# Patient Record
Sex: FEMALE | Race: WHITE | NOT HISPANIC OR LATINO | Employment: OTHER | URBAN - NONMETROPOLITAN AREA
[De-identification: names, ages, dates, MRNs, and addresses within clinical notes are randomized per-mention and may not be internally consistent; named-entity substitution may affect disease eponyms.]

---

## 2017-05-02 ENCOUNTER — OFFICE VISIT (OUTPATIENT)
Dept: CARDIOLOGY | Facility: CLINIC | Age: 82
End: 2017-05-02

## 2017-05-02 VITALS
OXYGEN SATURATION: 92 % | BODY MASS INDEX: 26.58 KG/M2 | WEIGHT: 135.4 LBS | DIASTOLIC BLOOD PRESSURE: 58 MMHG | SYSTOLIC BLOOD PRESSURE: 94 MMHG | HEART RATE: 81 BPM | HEIGHT: 60 IN

## 2017-05-02 DIAGNOSIS — I25.119 CORONARY ARTERY DISEASE INVOLVING NATIVE CORONARY ARTERY OF NATIVE HEART WITH ANGINA PECTORIS (HCC): Primary | ICD-10-CM

## 2017-05-02 DIAGNOSIS — Z79.899 ENCOUNTER FOR LONG-TERM CURRENT USE OF MEDICATION: ICD-10-CM

## 2017-05-02 DIAGNOSIS — R00.2 PALPITATIONS: ICD-10-CM

## 2017-05-02 DIAGNOSIS — I10 ESSENTIAL HYPERTENSION: ICD-10-CM

## 2017-05-02 DIAGNOSIS — Z00.00 HEALTHCARE MAINTENANCE: ICD-10-CM

## 2017-05-02 DIAGNOSIS — R07.9 CHEST PAIN, UNSPECIFIED TYPE: ICD-10-CM

## 2017-05-02 DIAGNOSIS — R06.02 SHORTNESS OF BREATH: ICD-10-CM

## 2017-05-02 DIAGNOSIS — E78.5 HYPERLIPIDEMIA, UNSPECIFIED HYPERLIPIDEMIA TYPE: ICD-10-CM

## 2017-05-02 DIAGNOSIS — I42.9 CARDIOMYOPATHY (HCC): ICD-10-CM

## 2017-05-02 PROCEDURE — 99214 OFFICE O/P EST MOD 30 MIN: CPT | Performed by: NURSE PRACTITIONER

## 2017-05-02 PROCEDURE — 93000 ELECTROCARDIOGRAM COMPLETE: CPT | Performed by: NURSE PRACTITIONER

## 2017-05-02 RX ORDER — LEVOTHYROXINE SODIUM 0.05 MG/1
50 TABLET ORAL DAILY
Refills: 0 | COMMUNITY
Start: 2017-03-22 | End: 2017-07-07

## 2017-05-03 ENCOUNTER — TELEPHONE (OUTPATIENT)
Dept: CARDIOLOGY | Facility: CLINIC | Age: 82
End: 2017-05-03

## 2017-05-05 ENCOUNTER — TELEPHONE (OUTPATIENT)
Dept: CARDIOLOGY | Facility: CLINIC | Age: 82
End: 2017-05-05

## 2017-05-11 ENCOUNTER — OFFICE VISIT (OUTPATIENT)
Dept: CARDIOLOGY | Facility: CLINIC | Age: 82
End: 2017-05-11

## 2017-05-11 VITALS — OXYGEN SATURATION: 98 % | HEART RATE: 87 BPM | DIASTOLIC BLOOD PRESSURE: 47 MMHG | SYSTOLIC BLOOD PRESSURE: 74 MMHG

## 2017-05-11 DIAGNOSIS — I10 ESSENTIAL HYPERTENSION: ICD-10-CM

## 2017-05-11 DIAGNOSIS — R07.9 CHEST PAIN, UNSPECIFIED TYPE: ICD-10-CM

## 2017-05-11 DIAGNOSIS — R06.02 SHORTNESS OF BREATH: ICD-10-CM

## 2017-05-11 DIAGNOSIS — I47.1 SVT (SUPRAVENTRICULAR TACHYCARDIA) (HCC): Primary | ICD-10-CM

## 2017-05-11 DIAGNOSIS — E78.5 HYPERLIPIDEMIA, UNSPECIFIED HYPERLIPIDEMIA TYPE: ICD-10-CM

## 2017-05-11 PROCEDURE — 93000 ELECTROCARDIOGRAM COMPLETE: CPT | Performed by: NURSE PRACTITIONER

## 2017-05-11 PROCEDURE — 99214 OFFICE O/P EST MOD 30 MIN: CPT | Performed by: NURSE PRACTITIONER

## 2017-05-11 RX ORDER — AMIODARONE HYDROCHLORIDE 200 MG/1
TABLET ORAL
Qty: 60 TABLET | Refills: 3 | Status: SHIPPED | OUTPATIENT
Start: 2017-05-11 | End: 2017-07-07

## 2017-05-12 ENCOUNTER — CLINICAL SUPPORT (OUTPATIENT)
Dept: CARDIOLOGY | Facility: CLINIC | Age: 82
End: 2017-05-12

## 2017-05-12 VITALS — DIASTOLIC BLOOD PRESSURE: 54 MMHG | HEART RATE: 78 BPM | SYSTOLIC BLOOD PRESSURE: 94 MMHG

## 2017-05-12 DIAGNOSIS — I95.0 IDIOPATHIC HYPOTENSION: Primary | ICD-10-CM

## 2017-05-12 DIAGNOSIS — R53.82 CHRONIC FATIGUE: ICD-10-CM

## 2017-05-12 DIAGNOSIS — E86.0 DEHYDRATION: ICD-10-CM

## 2017-05-12 DIAGNOSIS — R79.89 ELEVATED SERUM CREATININE: ICD-10-CM

## 2017-05-12 PROBLEM — I95.9 HYPOTENSION: Status: ACTIVE | Noted: 2017-05-12

## 2017-05-15 ENCOUNTER — TELEPHONE (OUTPATIENT)
Dept: CARDIOLOGY | Facility: CLINIC | Age: 82
End: 2017-05-15

## 2017-05-15 DIAGNOSIS — Z79.899 ENCOUNTER FOR LONG-TERM CURRENT USE OF MEDICATION: Primary | ICD-10-CM

## 2017-05-15 DIAGNOSIS — R79.89 ELEVATED SERUM CREATININE: ICD-10-CM

## 2017-05-15 DIAGNOSIS — Z00.00 HEALTHCARE MAINTENANCE: ICD-10-CM

## 2017-05-18 ENCOUNTER — TELEPHONE (OUTPATIENT)
Dept: CARDIOLOGY | Facility: CLINIC | Age: 82
End: 2017-05-18

## 2017-05-19 ENCOUNTER — OUTSIDE FACILITY SERVICE (OUTPATIENT)
Dept: CARDIOLOGY | Facility: CLINIC | Age: 82
End: 2017-05-19

## 2017-05-19 ENCOUNTER — TELEPHONE (OUTPATIENT)
Dept: CARDIOLOGY | Facility: CLINIC | Age: 82
End: 2017-05-19

## 2017-05-19 PROCEDURE — 93228 REMOTE 30 DAY ECG REV/REPORT: CPT | Performed by: INTERNAL MEDICINE

## 2017-05-25 ENCOUNTER — HOSPITAL ENCOUNTER (OUTPATIENT)
Dept: CARDIOLOGY | Facility: HOSPITAL | Age: 82
Discharge: HOME | End: 2017-05-25

## 2017-05-25 ENCOUNTER — CLINICAL SUPPORT (OUTPATIENT)
Dept: CARDIOLOGY | Facility: CLINIC | Age: 82
End: 2017-05-25

## 2017-05-25 ENCOUNTER — OUTSIDE FACILITY SERVICE (OUTPATIENT)
Dept: CARDIOLOGY | Facility: CLINIC | Age: 82
End: 2017-05-25

## 2017-05-25 VITALS
DIASTOLIC BLOOD PRESSURE: 55 MMHG | HEIGHT: 60 IN | HEART RATE: 61 BPM | OXYGEN SATURATION: 95 % | WEIGHT: 135 LBS | SYSTOLIC BLOOD PRESSURE: 113 MMHG | BODY MASS INDEX: 26.5 KG/M2

## 2017-05-25 DIAGNOSIS — I47.1 SVT (SUPRAVENTRICULAR TACHYCARDIA) (HCC): Primary | ICD-10-CM

## 2017-05-25 LAB
MAXIMAL PREDICTED HEART RATE: 140 BPM
STRESS TARGET HR: 119 BPM

## 2017-05-25 PROCEDURE — 93306 TTE W/DOPPLER COMPLETE: CPT

## 2017-05-25 PROCEDURE — 25010000002 REGADENOSON 0.4 MG/5ML SOLUTION: Performed by: INTERNAL MEDICINE

## 2017-05-25 PROCEDURE — 93306 TTE W/DOPPLER COMPLETE: CPT | Performed by: INTERNAL MEDICINE

## 2017-05-25 PROCEDURE — A9500 TC99M SESTAMIBI: HCPCS | Performed by: INTERNAL MEDICINE

## 2017-05-25 PROCEDURE — 93018 CV STRESS TEST I&R ONLY: CPT | Performed by: INTERNAL MEDICINE

## 2017-05-25 PROCEDURE — 78452 HT MUSCLE IMAGE SPECT MULT: CPT

## 2017-05-25 PROCEDURE — 78452 HT MUSCLE IMAGE SPECT MULT: CPT | Performed by: INTERNAL MEDICINE

## 2017-05-25 PROCEDURE — 93017 CV STRESS TEST TRACING ONLY: CPT

## 2017-05-25 PROCEDURE — 93000 ELECTROCARDIOGRAM COMPLETE: CPT | Performed by: NURSE PRACTITIONER

## 2017-05-25 PROCEDURE — 0 TECHNETIUM SESTAMIBI: Performed by: INTERNAL MEDICINE

## 2017-05-25 RX ADMIN — Medication 1 DOSE: at 11:45

## 2017-05-25 RX ADMIN — REGADENOSON 0.4 MG: 0.08 INJECTION, SOLUTION INTRAVENOUS at 11:45

## 2017-05-31 ENCOUNTER — DOCUMENTATION (OUTPATIENT)
Dept: CARDIOLOGY | Facility: CLINIC | Age: 82
End: 2017-05-31

## 2017-06-20 ENCOUNTER — OFFICE VISIT (OUTPATIENT)
Dept: CARDIOLOGY | Facility: CLINIC | Age: 82
End: 2017-06-20

## 2017-06-20 VITALS
HEIGHT: 60 IN | WEIGHT: 134 LBS | HEART RATE: 64 BPM | DIASTOLIC BLOOD PRESSURE: 66 MMHG | SYSTOLIC BLOOD PRESSURE: 103 MMHG | OXYGEN SATURATION: 96 % | BODY MASS INDEX: 26.31 KG/M2

## 2017-06-20 DIAGNOSIS — I10 ESSENTIAL HYPERTENSION: ICD-10-CM

## 2017-06-20 DIAGNOSIS — R06.02 SHORTNESS OF BREATH: ICD-10-CM

## 2017-06-20 DIAGNOSIS — R94.39 ABNORMAL STRESS TEST: ICD-10-CM

## 2017-06-20 DIAGNOSIS — E78.5 HYPERLIPIDEMIA, UNSPECIFIED HYPERLIPIDEMIA TYPE: ICD-10-CM

## 2017-06-20 DIAGNOSIS — I25.10 CORONARY ARTERY DISEASE INVOLVING NATIVE CORONARY ARTERY OF NATIVE HEART WITHOUT ANGINA PECTORIS: Primary | ICD-10-CM

## 2017-06-20 PROCEDURE — 99213 OFFICE O/P EST LOW 20 MIN: CPT | Performed by: NURSE PRACTITIONER

## 2017-06-20 NOTE — PROGRESS NOTES
Subjective   Ann Marie Burkett is a 82 y.o. female     Chief Complaint   Patient presents with   • Follow-up     patient appears in office today for follow up with test results       HPI    Problem List:    1.) Minor Non-Obstructive CAD per cath. 2006  1.1) Stress Test 5/25/17 - anteroapical and apical infarct with minimal saqib-infarct ischemia; preserved LVEF  2.) Dilated cardiomyopathy  2.1) Echo 12/16/13 - mild LVH; EF 50-55%; DD II; hypermobile intra-atrial septum with marce septal aneurism or doppler evidence of trans-atrial flow; mild AS; mild MR and TR; trace AZ; PA 35-40  2.2) Echo 5/25/17 - mild LVH; EF 45-50%; DD II; mild MR, TR and AZ PA 40-45  3.) HTN  4.) Dyslipidemia  5.) Shortness of Breath with exertion  6.) SVT     Patient is an 82-year-old female who presents today for a follow-up on test results with her daughter at her side.  She denies any chest pain, pressure, palpitations, fluttering, dizziness, presyncope, syncope, orthopnea or PND.  She does get some edema in her ankles.  She says that she is very short of breath and fatigued with activity and this is really bothersome for her.      We went over stress and echo.  Patient and her daughter want to discuss POC with her other children and her .  I explained the process of the [facility].      Current Outpatient Prescriptions   Medication Sig Dispense Refill   • acetaminophen (TYLENOL) 325 MG tablet Take  by mouth.     • amiodarone (PACERONE) 200 MG tablet 200 MG THREE TIMES A DAY WITH MEALS FOR 2 WEEKS THEN DAILY WITH MEALS 60 tablet 3   • aspirin 81 MG tablet Take  by mouth daily.     • carvedilol (COREG) 12.5 MG tablet Take 12.5 mg by mouth 2 (Two) Times a Day As Needed. Only to be taken if b/p is over 110 systolic     • citalopram (CeleXA) 20 MG tablet Take  by mouth daily.     • fenofibrate micronized (LOFIBRA) 134 MG capsule Take  by mouth daily.     • ferrous sulfate 325 (65 FE) MG tablet Take  by mouth daily.     • Flaxseed-Sunshine Prim-Borage  (FLAX OIL XTRA) capsule Take  by mouth.     • furosemide (LASIX) 40 MG tablet Take 20 mg by mouth Daily As Needed.     • glipiZIDE (GLUCOTROL) 5 MG ER tablet Take  by mouth daily.     • Glucosamine-Chondroit-Vit C-Mn (GLUCOSAMINE-CHONDROITIN) tablet Take  by mouth.     • levothyroxine (SYNTHROID, LEVOTHROID) 50 MCG tablet Take 50 mcg by mouth Daily.  0   • Linagliptin (TRADJENTA PO) Take 50 mg by mouth Daily.     • Multiple Vitamins-Minerals (CENTRUM SILVER) tablet Take  by mouth daily.     • nitroglycerin (NITROSTAT) 0.4 MG SL tablet Place 1 tablet under the tongue every 5 (five) minutes as needed for chest pain. 30 tablet 3   • Omega-3 Fatty Acids (FISH OIL) 1000 MG capsule capsule Take  by mouth.     • omeprazole (PriLOSEC) 20 MG capsule daily.  0   • rosuvastatin (CRESTOR) 20 MG tablet Take  by mouth.     • traMADol (ULTRAM) 50 MG tablet Take  by mouth.       No current facility-administered medications for this visit.        ALLERGIES    Review of patient's allergies indicates no known allergies.    Past Medical History:   Diagnosis Date   • Congestive cardiomyopathy    • Coronary artery disease    • Dyslipidemia    • History of esophageal reflux    • History of osteoarthritis    • Hyperlipidemia    • Hypertension        Social History     Social History   • Marital status:      Spouse name: N/A   • Number of children: N/A   • Years of education: N/A     Occupational History   • Not on file.     Social History Main Topics   • Smoking status: Never Smoker   • Smokeless tobacco: Not on file   • Alcohol use No   • Drug use: No   • Sexual activity: Not on file     Other Topics Concern   • Not on file     Social History Narrative       Family History   Problem Relation Age of Onset   • Arthritis Mother    • Hypertension Mother    • Heart disease Mother    • Heart failure Mother    • Heart disease Father    • Hypertension Father    • Arthritis Father        Review of Systems   Constitutional: Positive for  "fatigue (increase in fatigue). Negative for diaphoresis.   HENT: Positive for rhinorrhea. Negative for sneezing.    Eyes: Positive for visual disturbance (wears glasses daily).   Respiratory: Positive for shortness of breath (pt states she gets SOA quickly). Negative for cough, chest tightness and wheezing.    Cardiovascular: Positive for leg swelling (BLE slight swelling around ankles). Negative for chest pain and palpitations.   Gastrointestinal: Negative for abdominal pain, nausea and vomiting.   Endocrine: Negative for cold intolerance, heat intolerance, polyphagia and polyuria.   Genitourinary: Positive for urgency (pt states she cannot hold her bladder). Negative for difficulty urinating and frequency.   Musculoskeletal: Positive for arthralgias (pt states she has arthritis all over). Negative for back pain, joint swelling, myalgias and neck pain.   Skin: Negative.  Negative for rash and wound.   Allergic/Immunologic: Positive for environmental allergies. Negative for food allergies.   Neurological: Negative for dizziness, weakness, light-headedness and headaches.   Hematological: Does not bruise/bleed easily.   Psychiatric/Behavioral: Positive for agitation (pt states she gets aggitated easily somedays) and confusion (patient states she gets aggitated easily). Negative for sleep disturbance. The patient is not nervous/anxious.        Objective   /66 (BP Location: Left arm, Patient Position: Sitting)  Pulse 64  Ht 60\" (152.4 cm)  Wt 134 lb (60.8 kg)  SpO2 96%  BMI 26.17 kg/m2  Lab Results (most recent)     None        Physical Exam   Constitutional: She is oriented to person, place, and time. Vital signs are normal. She appears well-developed and well-nourished. She is active and cooperative.   HENT:   Head: Normocephalic.   Eyes: Lids are normal.   Wears glasses    Neck: Normal carotid pulses, no hepatojugular reflux and no JVD present. Carotid bruit is not present.   Abdominal: Normal appearance " and bowel sounds are normal.   Musculoskeletal:        Left knee: Tenderness found.   Uses a cane    Neurological: She is alert and oriented to person, place, and time.   Skin: Skin is warm, dry and intact.   Psychiatric: She has a normal mood and affect. Her speech is normal and behavior is normal. Judgment and thought content normal. Cognition and memory are normal.       Procedure   Procedures         Assessment/Plan      Diagnosis Plan   1. Coronary artery disease involving native coronary artery of native heart without angina pectoris     2. Essential hypertension     3. Hyperlipidemia, unspecified hyperlipidemia type     4. Abnormal stress test     5. Shortness of breath         Return patient will call for follow-up .    CAD/Abnormal stress test/Hypertension/Dyslipidemia/Shortness of breath - Patient will discuss having LHC with her family and let us know how she wants to proceed.  I went over the procedure with them and the need for Plavix if that is the case.  They will call back in and let us know.  Also discussed pulmonary referral due to Pulmonary Hypertension, but if they have cath they will wait until those findings to determine if they want to go to Pulmonary or not.  She will continue her medication regimen for now and will make follow-up once they determine if she will have a LHC or not.

## 2017-06-20 NOTE — PATIENT INSTRUCTIONS
Coronary Angiogram  A coronary angiogram, also called coronary angiography, is an X-ray procedure used to look at the arteries in the heart. In this procedure, a dye (contrast dye) is injected through a long, hollow tube (catheter). The catheter is about the size of a piece of cooked spaghetti and is inserted through your groin, wrist, or arm. The dye is injected into each artery, and X-rays are then taken to show if there is a blockage in the arteries of your heart.  LET YOUR HEALTH CARE PROVIDER KNOW ABOUT:  · Any allergies you have, including allergies to shellfish or contrast dye.    · All medicines you are taking, including vitamins, herbs, eye drops, creams, and over-the-counter medicines.    · Previous problems you or members of your family have had with the use of anesthetics.    · Any blood disorders you have.    · Previous surgeries you have had.  · History of kidney problems or failure.    · Other medical conditions you have.  RISKS AND COMPLICATIONS   Generally, a coronary angiogram is a safe procedure. However, problems can occur and include:  · Allergic reaction to the dye.  · Bleeding from the access site or other locations.  · Kidney injury, especially in people with impaired kidney function.   · Stroke (rare).  · Heart attack (rare).  BEFORE THE PROCEDURE   · Do not eat or drink anything after midnight the night before the procedure or as directed by your health care provider.    · Ask your health care provider about changing or stopping your regular medicines. This is especially important if you are taking diabetes medicines or blood thinners.  PROCEDURE  · You may be given a medicine to help you relax (sedative) before the procedure. This medicine is given through an intravenous (IV) access tube that is inserted into one of your veins.    · The area where the catheter will be inserted will be washed and shaved. This is usually done in the groin but may be done in the fold of your arm (near your  elbow) or in the wrist.     · A medicine will be given to numb the area where the catheter will be inserted (local anesthetic).    · The health care provider will insert the catheter into an artery. The catheter will be guided by using a special type of X-ray (fluoroscopy) of the blood vessel being examined.    · A special dye will then be injected into the catheter, and X-rays will be taken. The dye will help to show where any narrowing or blockages are located in the heart arteries.    AFTER THE PROCEDURE   · If the procedure is done through the leg, you will be kept in bed lying flat for several hours. You will be instructed to not bend or cross your legs.  · The insertion site will be checked frequently.    · The pulse in your feet or wrist will be checked frequently.    · Additional blood tests, X-rays, and an electrocardiogram may be done.       This information is not intended to replace advice given to you by your health care provider. Make sure you discuss any questions you have with your health care provider.     Document Released: 06/23/2004 Document Revised: 01/08/2016 Document Reviewed: 05/12/2014  [employer] Interactive Patient Education ©2017 [employer]

## 2017-06-29 ENCOUNTER — TELEPHONE (OUTPATIENT)
Dept: CARDIOLOGY | Facility: CLINIC | Age: 82
End: 2017-06-29

## 2017-06-29 NOTE — TELEPHONE ENCOUNTER
I talked to the patient daughter. She was worried about a drug interaction. I talked with PARK Gupta and she stated that we watch the patient with [facility] and if she has any problems to give us a call at the office.

## 2017-06-29 NOTE — TELEPHONE ENCOUNTER
----- Message from Qiana Saucedo sent at 6/29/2017 11:25 AM EDT -----  Contact: irene (daughter)  THE PATIENTS DAUGHTER CALLED AND WANTED TO SPEAK TO A NURSE ABOUT A POSSIBLE DRUG INTERACTION.  SHE CAN BE REACHED -[phone].  THANKS

## 2017-07-07 ENCOUNTER — CONSULT (OUTPATIENT)
Dept: CARDIOLOGY | Facility: CLINIC | Age: 82
End: 2017-07-07

## 2017-07-07 VITALS
SYSTOLIC BLOOD PRESSURE: 128 MMHG | DIASTOLIC BLOOD PRESSURE: 62 MMHG | WEIGHT: 130 LBS | HEIGHT: 65 IN | BODY MASS INDEX: 21.66 KG/M2 | HEART RATE: 58 BPM

## 2017-07-07 DIAGNOSIS — I42.0 CONGESTIVE CARDIOMYOPATHY (HCC): ICD-10-CM

## 2017-07-07 DIAGNOSIS — R94.39 ABNORMAL STRESS TEST: ICD-10-CM

## 2017-07-07 DIAGNOSIS — I10 ESSENTIAL HYPERTENSION: ICD-10-CM

## 2017-07-07 DIAGNOSIS — I47.1 SVT (SUPRAVENTRICULAR TACHYCARDIA) (HCC): Primary | ICD-10-CM

## 2017-07-07 PROCEDURE — 99203 OFFICE O/P NEW LOW 30 MIN: CPT | Performed by: INTERNAL MEDICINE

## 2017-07-07 RX ORDER — LEVOTHYROXINE SODIUM 0.03 MG/1
25 TABLET ORAL DAILY
COMMUNITY
End: 2018-01-15 | Stop reason: DRUGHIGH

## 2017-07-07 RX ORDER — AMIODARONE HYDROCHLORIDE 200 MG/1
200 TABLET ORAL 2 TIMES DAILY
COMMUNITY
End: 2018-01-25 | Stop reason: SDUPTHER

## 2017-07-07 RX ORDER — POLYETHYLENE GLYCOL 3350 17 G/17G
17 POWDER, FOR SOLUTION ORAL DAILY PRN
COMMUNITY

## 2017-07-07 NOTE — PROGRESS NOTES
Ann Marie Burkett  1935  [phone]      07/07/2017    [facility] CARDIOLOGY    Leonel Leger MD  [address] [place] [address]    REFERRING DOCTOR : Dr. Chaudhari        Patient ID: Ann Marie Burkett is a 82 y.o. female resident of [place], [place]    Chief Complaint: SVT    Problem List:  1. SVT    A)Amiodarone started 5/11/17 decreased to once a day 5/27/17   B)Episode in Dr chaudhari's office with cv to sinus with vagal maneuvers.  I have no documentation of any atrial fibrillation.    2. Dilated NICM   A)Echo 12/16/13 EF 50%   B)Echo 5/25/17 EF 45-50%, mild LAE.   3. Vulva Cancer with upcoming evaluation   4. Nonobstructive CAD by remote [facility]. 2006    No Known Allergies    Current Outpatient Prescriptions:   •  acetaminophen (TYLENOL) 325 MG tablet, Take  by mouth., Disp: , Rfl:   •  amiodarone (PACERONE) 200 MG tablet, Take 200 mg by mouth Daily., Disp: , Rfl:   •  aspirin 81 MG tablet, Take  by mouth daily., Disp: , Rfl:   •  carvedilol (COREG) 12.5 MG tablet, Take 12.5 mg by mouth 2 (Two) Times a Day As Needed. Only to be taken if b/p is over 110 systolic, Disp: , Rfl:   •  citalopram (CeleXA) 20 MG tablet, Take  by mouth daily., Disp: , Rfl:   •  fenofibrate micronized (LOFIBRA) 134 MG capsule, Take  by mouth daily., Disp: , Rfl:   •  ferrous sulfate 325 (65 FE) MG tablet, Take  by mouth daily., Disp: , Rfl:   •  Flaxseed-Sunshine Prim-Borage (FLAX OIL XTRA) capsule, Take  by mouth., Disp: , Rfl:   •  glipiZIDE (GLUCOTROL) 5 MG ER tablet, Take  by mouth daily., Disp: , Rfl:   •  levothyroxine (SYNTHROID, LEVOTHROID) 25 MCG tablet, Take 25 mcg by mouth Daily., Disp: , Rfl:   •  Linagliptin (TRADJENTA PO), Take 50 mg by mouth Daily., Disp: , Rfl:   •  Multiple Vitamins-Minerals (CENTRUM SILVER) tablet, Take  by mouth daily., Disp: , Rfl:   •  nitroglycerin (NITROSTAT) 0.4 MG SL tablet, Place 1 tablet under the tongue every 5 (five) minutes as needed for chest pain., Disp: 30 tablet,  Rfl: 3  •  Omega-3 Fatty Acids (FISH OIL) 1000 MG capsule capsule, Take  by mouth., Disp: , Rfl:   •  omeprazole (PriLOSEC) 20 MG capsule, daily., Disp: , Rfl: 0  •  polyethylene glycol (MIRALAX) packet, Take 17 g by mouth Daily., Disp: , Rfl:   •  rosuvastatin (CRESTOR) 20 MG tablet, Take  by mouth., Disp: , Rfl:   •  traMADol (ULTRAM) 50 MG tablet, Take  by mouth., Disp: , Rfl:     History of Present Illness  Patient presents today for consultation referred by Dr. Chaudhari regarding svt. She has had recent palpitations once occurring over a year ago only lasting a few hours.  IT has reoccurred the past few weeks happening a total of three times associated with palpitations, sob, and hypotension.  She started amiodarone with Dr. Chaudhari and tolerating the medication well.  She denies any recurrent episodes of Palpitations.  She denies any Chest pain, sob, or syncope events.  Overall since that time she's been doing well.  Seems the palpitations started in April at the time of her dealing with her GYN issues.  She's had 2 total episodes, one in April one in May.    The following portions of the patient's history were reviewed and updated as appropriate: allergies, current medications, past family history, past medical history, past social history, past surgical history and problem list.    Past Medical History:   Diagnosis Date   • Congestive cardiomyopathy    • Coronary artery disease    • Dyslipidemia    • History of esophageal reflux    • History of osteoarthritis    • Hyperlipidemia    • Hypertension          Past Surgical History:   Procedure Laterality Date   • CARDIAC CATHETERIZATION         Social History     Social History   • Marital status:      Spouse name: N/A   • Number of children: N/A   • Years of education: N/A     Occupational History   • Not on file.     Social History Main Topics   • Smoking status: Never Smoker   • Smokeless tobacco: Not on file   • Alcohol use No   • Drug use: No   •  "Sexual activity: Defer     Other Topics Concern   • Not on file     Social History Narrative       Family History   Problem Relation Age of Onset   • Arthritis Mother    • Hypertension Mother    • Heart disease Mother    • Heart failure Mother    • Heart disease Father    • Hypertension Father    • Arthritis Father        REVIEW OF SYSTEMS:   CONSTITUTIONAL: No weight loss, fever, chills, weakness or fatigue.   HEENT: Eyes: No visual loss, blurred vision, double vision or yellow sclerae. Ears, Nose, Throat: No hearing loss, sneezing, congestion, runny nose or sore throat.   SKIN: No rash or itching.     RESPIRATORY: No shortness of breath, hemoptysis, cough or sputum.   GASTROINTESTINAL: No anorexia, nausea, vomiting or diarrhea. No abdominal pain, bright red blood per rectum or melena.  GENITOURINARY: No burning on urination, hematuria or increased frequency.  NEUROLOGICAL: No headache, dizziness, syncope, paralysis, ataxia, numbness or tingling in the extremities. No change in bowel or bladder control.   MUSCULOSKELETAL: No muscle, back pain, joint pain or stiffness.   HEMATOLOGIC: No anemia, bleeding or bruising.   LYMPHATICS: No enlarged nodes. No history of splenectomy.   PSYCHIATRIC: No history of depression, anxiety, hallucinations.   ENDOCRINOLOGIC: No reports of sweating, cold or heat intolerance. No polyuria or polydipsia.   Ext: No edema or bruising      The patient's old records including ambulatory rhythm recordings (ECGs, Holter/event monitor) were reviewed and discussed.      EKG sinus bradycardia heart rate 58 bpm with a first-degree AV block.     Objective:       Vitals:    07/07/17 1358   BP: 128/62   BP Location: Left arm   Patient Position: Sitting   Pulse: 58   Weight: 130 lb (59 kg)   Height: 65\" (165.1 cm)       Constitutional: oriented to person, place, and time.  well-developed and well-nourished. No distress.   HENT: Normocephalic.   Eyes: Conjunctivae are normal. No scleral icterus. "   Neck: Normal carotid pulses, no hepatojugular reflux and no JVD present. Carotid bruit is not present. No tracheal deviation, no edema and no erythema present. No thyromegaly present.   Cardiovascular: Normal rate, regular rhythm, S1 normal, S2 normal, normal heart sounds and intact distal pulses.   No extrasystoles are present. PMI is not displaced.  Exam reveals no gallop, no distant heart sounds and no friction rub.    No murmur heard.  Pulses:       Radial pulses are 2+ on the right side, and 2+ on the left side.       Dorsalis pedis pulses are 2+ on the right side, and 2+ on the left side.   Pulmonary/Chest: Effort normal and breath sounds normal. No respiratory distress. She has no decreased breath sounds.  no wheezes,  Rhonchi or rales.  no tenderness.   Abdominal: Soft. Bowel sounds are normal. She exhibits no distension and no mass. There is no hepatosplenomegaly. There is no tenderness. There is no rebound and no guarding.   Musculoskeletal:  exhibits no edema, tenderness or deformity.   Neurological: is alert and oriented to person, place, and time.   Skin: Skin is warm and dry. No rash noted. No diaphoretic. No cyanosis or erythema. No pallor. Nails show no clubbing.   Psychiatric: Normal mood and affect.Speech is normal and behavior is normal.    Lab Review:   Results for orders placed or performed in visit on 05/02/17   Stress Test With Myocardial Perfusion One Day   Result Value Ref Range    Target HR (85%) 119 bpm    Max. Pred. HR (100%) 140 bpm           Diagnosis:      Diagnosis Plan   1. SVT (supraventricular tachycardia)     2. Abnormal stress test     3. Congestive cardiomyopathy     4. Essential hypertension           Assessment & Plan:   1.  SVT with up to at least 2-3 recurrences since April 2017.  No recurrences since starting amiodarone 200 mg daily.  Seems that the SVT episode started to occur at the time of her GYN issues in April.  I would continue the amiodarone for now and her get  through the valvular cancer issues and any procedure the need to be done.  Next follow-up consideration stopping the amiodarone if no recurrences and consider an EP study and ablation at that time if any recurrences or noted. Continue ASA  2.  Valvular cancer following up with [facility] here in the near future.  Okay per my standpoint to proceed with any surgery.  3.  Abnormal stress test ejection fraction 45%/ Cardiomyopathy. Dr. Chaudhari's team discussing left heart catheterization for the patient area and also noted to have some pulmonary hypertension which could be evaluated at the time of catheterization.  They need to discuss this prior to any procedure to see if Dr. Chaudhari is okay with proceeding with the surgery and if need for left heart catheterization is warranted.  4.  Follow-up in 3 or 4 months or sooner as needed.        FLAVAI Macdonald scribe for Dr. Jaime   07/07/17  3:02 PM    I, Ishan Jaime DO, personally performed the services described in this documentation as scribed by the above named individual in my presence, and it is both accurate and complete.  7/7/2017  3:38 PM    Ishan Jaime DO  3:38 PM  07/07/17

## 2017-07-11 ENCOUNTER — TELEPHONE (OUTPATIENT)
Dept: CARDIOLOGY | Facility: CLINIC | Age: 82
End: 2017-07-11

## 2017-07-11 NOTE — TELEPHONE ENCOUNTER
----- Message from Deb Hobbs sent at 7/10/2017 11:50 AM EDT -----  Contact: PT DAUGHTER MARIS CORDOVA  PLEASE CALL DAUGHTER BACK [phone]  SHE HAS SOME QUESTIONS ABOUT HER MOTHERS COREG (SPELLING)    Patient's daughter requested instructions for Coreg prn dosing that Carissa had previously given them. Reviewed medication instructions from patient's last office visit, Coreg 12.5 mg BID PRN SBP greater than 110. Maris verbalizes understanding.

## 2017-07-19 ENCOUNTER — OFFICE VISIT (OUTPATIENT)
Dept: CARDIOLOGY | Facility: CLINIC | Age: 82
End: 2017-07-19

## 2017-07-19 VITALS
OXYGEN SATURATION: 95 % | HEART RATE: 64 BPM | HEIGHT: 65 IN | WEIGHT: 135.2 LBS | DIASTOLIC BLOOD PRESSURE: 56 MMHG | SYSTOLIC BLOOD PRESSURE: 111 MMHG | BODY MASS INDEX: 22.53 KG/M2

## 2017-07-19 DIAGNOSIS — R06.02 SHORTNESS OF BREATH: ICD-10-CM

## 2017-07-19 DIAGNOSIS — I25.119 CORONARY ARTERY DISEASE INVOLVING NATIVE CORONARY ARTERY OF NATIVE HEART WITH ANGINA PECTORIS (HCC): ICD-10-CM

## 2017-07-19 DIAGNOSIS — Z00.00 HEALTHCARE MAINTENANCE: ICD-10-CM

## 2017-07-19 DIAGNOSIS — E78.5 HYPERLIPIDEMIA, UNSPECIFIED HYPERLIPIDEMIA TYPE: ICD-10-CM

## 2017-07-19 DIAGNOSIS — R94.39 ABNORMAL STRESS TEST: ICD-10-CM

## 2017-07-19 DIAGNOSIS — I25.10 CORONARY ARTERY DISEASE DUE TO CALCIFIED CORONARY LESION: Primary | ICD-10-CM

## 2017-07-19 DIAGNOSIS — R94.39 ABNORMAL STRESS TEST: Primary | ICD-10-CM

## 2017-07-19 DIAGNOSIS — I10 ESSENTIAL HYPERTENSION: ICD-10-CM

## 2017-07-19 DIAGNOSIS — I25.84 CORONARY ARTERY DISEASE DUE TO CALCIFIED CORONARY LESION: Primary | ICD-10-CM

## 2017-07-19 DIAGNOSIS — I47.1 SVT (SUPRAVENTRICULAR TACHYCARDIA) (HCC): ICD-10-CM

## 2017-07-19 PROCEDURE — 99214 OFFICE O/P EST MOD 30 MIN: CPT | Performed by: NURSE PRACTITIONER

## 2017-07-19 RX ORDER — CLOPIDOGREL BISULFATE 75 MG/1
TABLET ORAL
Qty: 90 TABLET | Refills: 1 | Status: SHIPPED | OUTPATIENT
Start: 2017-07-19 | End: 2017-09-20

## 2017-07-19 RX ORDER — CLOPIDOGREL BISULFATE 75 MG/1
75 TABLET ORAL DAILY
Qty: 30 TABLET | Refills: 11 | Status: SHIPPED | OUTPATIENT
Start: 2017-07-19 | End: 2017-07-19 | Stop reason: SDUPTHER

## 2017-07-19 NOTE — PROGRESS NOTES
Subjective   Ann Marie Burkett is a 82 y.o. female     Chief Complaint   Patient presents with   • Follow-up     presents as a follow up   • Shortness of Breath   • Coronary Artery Disease       HPI    Problem List:    1.) Minor Non-Obstructive CAD per cath. 2006  1.1) Stress Test 5/25/17 - anteroapical and apical infarct with minimal saqib-infarct ischemia; preserved LVEF  2.) Dilated cardiomyopathy  2.1) Echo 12/16/13 - mild LVH; EF 50-55%; DD II; hypermobile intra-atrial septum with marce septal aneurism or doppler evidence of trans-atrial flow; mild AS; mild MR and TR; trace OK; PA 35-40  2.2) Echo 5/25/17 - mild LVH; EF 45-50%; DD II; mild MR, TR and OK PA 40-45  3.) HTN  4.) Dyslipidemia  5.) Shortness of Breath with exertion  6.) SVT        7.) Vulvar CA, newly diagnosed going to get chemo and radiation at [facility].     Patient is an 82-year-old female who presents today for a follow-up with her daughter at her side.  She denies any chest pain, pressure, palpitations, fluttering, presyncope, syncope, orthopnea, PND or edema.  She will get dizzy she stands up too quickly.  She says she still does get short of breath especially if she tries to write should also she just tries to take her time however her shortness of breath has gotten worse over the past year or so.  I discussed with the patient the need for a left heart catheter.  Patient is about to undergo chemotherapy and radiation for newly diagnosed vulvar cancer.  Patient has a CT on Tuesday so we will get a BMP 1 week from next Tuesday and then determine when to schedule her heart catheter at that time.    Current Outpatient Prescriptions   Medication Sig Dispense Refill   • acetaminophen (TYLENOL) 325 MG tablet Take  by mouth.     • amiodarone (PACERONE) 200 MG tablet Take 200 mg by mouth Daily.     • aspirin 81 MG tablet Take  by mouth daily.     • carvedilol (COREG) 12.5 MG tablet Take 12.5 mg by mouth 2 (Two) Times a Day As Needed. Only to be taken if  b/p is over 130 systolic     • citalopram (CeleXA) 20 MG tablet Take  by mouth daily.     • fenofibrate micronized (LOFIBRA) 134 MG capsule Take  by mouth daily.     • ferrous sulfate 325 (65 FE) MG tablet Take  by mouth daily.     • Flaxseed-Sunshine Prim-Borage (FLAX OIL XTRA) capsule Take  by mouth.     • glipiZIDE (GLUCOTROL) 5 MG ER tablet Take  by mouth daily.     • levothyroxine (SYNTHROID, LEVOTHROID) 25 MCG tablet Take 25 mcg by mouth Daily.     • Linagliptin (TRADJENTA PO) Take 50 mg by mouth Daily.     • Multiple Vitamins-Minerals (CENTRUM SILVER) tablet Take  by mouth daily.     • nitroglycerin (NITROSTAT) 0.4 MG SL tablet Place 1 tablet under the tongue every 5 (five) minutes as needed for chest pain. 30 tablet 3   • Omega-3 Fatty Acids (FISH OIL) 1000 MG capsule capsule Take  by mouth.     • omeprazole (PriLOSEC) 20 MG capsule daily.  0   • polyethylene glycol (MIRALAX) packet Take 17 g by mouth Daily.     • rosuvastatin (CRESTOR) 20 MG tablet Take  by mouth.     • traMADol (ULTRAM) 50 MG tablet Take  by mouth.     • clopidogrel (PLAVIX) 75 MG tablet TAKE 1 TABLET BY MOUTH EVERY DAY 90 tablet 1     No current facility-administered medications for this visit.        ALLERGIES    Review of patient's allergies indicates no known allergies.    Past Medical History:   Diagnosis Date   • Cancer    • Congestive cardiomyopathy    • Coronary artery disease    • Dyslipidemia    • History of esophageal reflux    • History of osteoarthritis    • Hyperlipidemia    • Hypertension    • Vulvar cancer        Social History     Social History   • Marital status:      Spouse name: N/A   • Number of children: N/A   • Years of education: N/A     Occupational History   • Not on file.     Social History Main Topics   • Smoking status: Never Smoker   • Smokeless tobacco: Not on file   • Alcohol use No   • Drug use: No   • Sexual activity: Defer     Other Topics Concern   • Not on file     Social History Narrative  "      Family History   Problem Relation Age of Onset   • Arthritis Mother    • Hypertension Mother    • Heart disease Mother    • Heart failure Mother    • Heart disease Father    • Hypertension Father    • Arthritis Father        Review of Systems   Constitutional: Positive for fatigue. Negative for diaphoresis.   HENT: Positive for rhinorrhea. Negative for sneezing.    Eyes: Positive for visual disturbance (wears glasses ).   Respiratory: Positive for shortness of breath (if she rushes ). Negative for chest tightness.    Cardiovascular: Negative for chest pain, palpitations and leg swelling.   Gastrointestinal: Negative for nausea and vomiting.   Endocrine: Negative.    Genitourinary: Negative for difficulty urinating.        Vulvar CA   Musculoskeletal: Positive for arthralgias, back pain and gait problem (uses a cane). Negative for neck pain.   Skin: Negative.    Allergic/Immunologic: Positive for environmental allergies.   Neurological: Positive for dizziness (when she stands up quick ). Negative for syncope and light-headedness.   Hematological: Negative.    Psychiatric/Behavioral: Negative.        Objective   /56 (BP Location: Left arm, Patient Position: Sitting)  Pulse 64  Ht 65\" (165.1 cm)  Wt 135 lb 3.2 oz (61.3 kg)  SpO2 95%  BMI 22.5 kg/m2  Lab Results (most recent)     None        Physical Exam   Constitutional: She is oriented to person, place, and time. Vital signs are normal. She appears well-developed and well-nourished. She is active and cooperative.   HENT:   Head: Normocephalic.   Eyes: Lids are normal.   Wears glasses    Neck: Normal carotid pulses, no hepatojugular reflux and no JVD present. Carotid bruit is not present.   Cardiovascular: Normal rate, regular rhythm and normal heart sounds.    Pulses:       Radial pulses are 2+ on the right side, and 2+ on the left side.        Dorsalis pedis pulses are 2+ on the right side, and 2+ on the left side.        Posterior tibial pulses are " 2+ on the right side, and 2+ on the left side.   No edema BLE.   Pulmonary/Chest: Effort normal and breath sounds normal.   Abdominal: Normal appearance and bowel sounds are normal.   Musculoskeletal:   Uses a cane; curvature of the spine    Neurological: She is alert and oriented to person, place, and time.   Skin: Skin is warm, dry and intact.   Psychiatric: She has a normal mood and affect. Her speech is normal and behavior is normal. Judgment and thought content normal. Cognition and memory are normal.       Procedure   Procedures         Assessment/Plan      Diagnosis Plan   1. Abnormal stress test  Cardiac catheterization   2. SVT (supraventricular tachycardia)  Cardiac catheterization   3. Essential hypertension  Basic Metabolic Panel    Basic Metabolic Panel    Cardiac catheterization   4. Hyperlipidemia, unspecified hyperlipidemia type  Cardiac catheterization   5. Healthcare maintenance  Basic Metabolic Panel    Basic Metabolic Panel   6. Shortness of breath  Cardiac catheterization   7. Coronary artery disease involving native coronary artery of native heart with angina pectoris         Return we will schedule after we know [facility] date .  CAD/Abnormal stress test/shortness of breath/SVT/hypertension/hyperlipidemia-patient will have left heart catheter scheduled after having CT next Tuesday.  She will get a BMP 1 week after her CT scan and then we will determine the date for her heart catheter at that time.  Her needing to monitor creatinine to make sure that she gets back to baseline prior to her heart catheter.  She will start Plavix prior to her heart catheter and continue her medication regimen otherwise.  She will follow-up after left heart catheter or sooner if any changes.

## 2017-07-19 NOTE — PATIENT INSTRUCTIONS
Coronary Angiogram  A coronary angiogram, also called coronary angiography, is an X-ray procedure used to look at the arteries in the heart. In this procedure, a dye (contrast dye) is injected through a long, hollow tube (catheter). The catheter is about the size of a piece of cooked spaghetti and is inserted through your groin, wrist, or arm. The dye is injected into each artery, and X-rays are then taken to show if there is a blockage in the arteries of your heart.  LET YOUR HEALTH CARE PROVIDER KNOW ABOUT:  · Any allergies you have, including allergies to shellfish or contrast dye.    · All medicines you are taking, including vitamins, herbs, eye drops, creams, and over-the-counter medicines.    · Previous problems you or members of your family have had with the use of anesthetics.    · Any blood disorders you have.    · Previous surgeries you have had.  · History of kidney problems or failure.    · Other medical conditions you have.  RISKS AND COMPLICATIONS   Generally, a coronary angiogram is a safe procedure. However, problems can occur and include:  · Allergic reaction to the dye.  · Bleeding from the access site or other locations.  · Kidney injury, especially in people with impaired kidney function.   · Stroke (rare).  · Heart attack (rare).  BEFORE THE PROCEDURE   · Do not eat or drink anything after midnight the night before the procedure or as directed by your health care provider.    · Ask your health care provider about changing or stopping your regular medicines. This is especially important if you are taking diabetes medicines or blood thinners.  PROCEDURE  · You may be given a medicine to help you relax (sedative) before the procedure. This medicine is given through an intravenous (IV) access tube that is inserted into one of your veins.    · The area where the catheter will be inserted will be washed and shaved. This is usually done in the groin but may be done in the fold of your arm (near your  elbow) or in the wrist.     · A medicine will be given to numb the area where the catheter will be inserted (local anesthetic).    · The health care provider will insert the catheter into an artery. The catheter will be guided by using a special type of X-ray (fluoroscopy) of the blood vessel being examined.    · A special dye will then be injected into the catheter, and X-rays will be taken. The dye will help to show where any narrowing or blockages are located in the heart arteries.    AFTER THE PROCEDURE   · If the procedure is done through the leg, you will be kept in bed lying flat for several hours. You will be instructed to not bend or cross your legs.  · The insertion site will be checked frequently.    · The pulse in your feet or wrist will be checked frequently.    · Additional blood tests, X-rays, and an electrocardiogram may be done.       This information is not intended to replace advice given to you by your health care provider. Make sure you discuss any questions you have with your health care provider.     Document Released: 06/23/2004 Document Revised: 01/08/2016 Document Reviewed: 05/12/2014  [employer] Interactive Patient Education ©2017 [employer]

## 2017-07-20 ENCOUNTER — TELEPHONE (OUTPATIENT)
Dept: CARDIOLOGY | Facility: CLINIC | Age: 82
End: 2017-07-20

## 2017-07-20 NOTE — TELEPHONE ENCOUNTER
She just wanted to confirm what we discussed yesterday re: labs.  She will call the day they get the labs done so we know to look for them and can get the [facility] set up ASAP.  We are going to try to squeeze in her that week.

## 2017-08-01 ENCOUNTER — TELEPHONE (OUTPATIENT)
Dept: CARDIOLOGY | Facility: CLINIC | Age: 82
End: 2017-08-01

## 2017-08-01 NOTE — TELEPHONE ENCOUNTER
----- Message from Qiana Saucedo sent at 8/1/2017 11:07 AM EDT -----  Contact: SVITLANA (DAUGHTER)  THE PATIENTS DAUGHTER CALLED WANTING TO GET HER LAB RESULTS. SHE THOSE DONE YESTERDAY AT [facility].  SHE CAN BE REACHED [phone].  THANKS      Spoke with patient's daughter, Svitlana and instructed her re: her mother's recent lab results. I have also instructed her re: Cardiac Catheterization scheduled for 8/3/17, pre-cath meds and instructions. Patient's daughter verbalized understanding of these instructions and instructions for Pre-Med.

## 2017-08-03 ENCOUNTER — OUTSIDE FACILITY SERVICE (OUTPATIENT)
Dept: CARDIOLOGY | Facility: CLINIC | Age: 82
End: 2017-08-03

## 2017-08-03 PROCEDURE — 93458 L HRT ARTERY/VENTRICLE ANGIO: CPT | Performed by: INTERNAL MEDICINE

## 2017-09-07 ENCOUNTER — TELEPHONE (OUTPATIENT)
Dept: CARDIOLOGY | Facility: CLINIC | Age: 82
End: 2017-09-07

## 2017-09-07 NOTE — TELEPHONE ENCOUNTER
----- Message from PARK Pina sent at 9/7/2017  7:31 AM EDT -----  Contact: SVITLANA (DAUGHTER)  I would not as it will lower her BP even more and then may cause HR to stay high.  If she has consistent elevated HR with low BP have them call and get her in.  Dehydration will definitely cause both.  Thanks!  ----- Message -----     From: Dede Kilgore LPN     Sent: 9/6/2017   4:56 PM       To: PARK Pina    Occurred over the weekend, but Svitlana states that patient was dehydrated and is receiving fluids on an outpatient basis. She received fluids yesterday and will again tomorrow. Svitlana states she is unsure if she should give Coreg for an elevated HR with BP being low.   ----- Message -----     From: PARK Pina     Sent: 9/5/2017   3:16 PM       To: Dede Kilgore LPN    Is it consistently staying in 100s or was it just one time?  ----- Message -----     From: Dede Kilgore LPN     Sent: 9/5/2017   3:06 PM       To: PARK Pina    Do you want them to give her Coreg? Reports no problems at this time.   ----- Message -----     From: Qiana Saucedo     Sent: 9/5/2017   2:43 PM       To: Zakiya Chaudhari [facility]    THE PATIENT'S DAUGHTER CALLED AND STATES SHE IS GIVING HER MOTHER COREG IF HER BLOOD PRESSURE IS ABOVE 130 OR HER HEART RATE IS ABOVE 60.  SHE STATES RIGHT NOW HER BLOOD PRESSURE /64 AND HER HEART RATE .  SHE IS WANTING TO KNOW IF SHE NEEDS TO GIVE HER THE COREG OR HOLD IT.  SHE CAN BE REACHED [phone]. SHE STATES SHE MAY NOT BE ABLE TO ANSWER HER PHONE AND IF SHE DOESN'T TO PLEASE LEAVE A MESSAGE.  THANKS      Spoke with Svitlana this AM re: dosing with Coreg. She is instructed to call if any further problems with elevated HR and decreased BP.

## 2017-09-20 ENCOUNTER — OFFICE VISIT (OUTPATIENT)
Dept: CARDIOLOGY | Facility: CLINIC | Age: 82
End: 2017-09-20

## 2017-09-20 VITALS
BODY MASS INDEX: 20.06 KG/M2 | HEIGHT: 65 IN | DIASTOLIC BLOOD PRESSURE: 56 MMHG | SYSTOLIC BLOOD PRESSURE: 98 MMHG | OXYGEN SATURATION: 96 % | WEIGHT: 120.4 LBS | HEART RATE: 84 BPM

## 2017-09-20 DIAGNOSIS — E78.5 HYPERLIPIDEMIA, UNSPECIFIED HYPERLIPIDEMIA TYPE: ICD-10-CM

## 2017-09-20 DIAGNOSIS — I47.1 SVT (SUPRAVENTRICULAR TACHYCARDIA) (HCC): Primary | ICD-10-CM

## 2017-09-20 DIAGNOSIS — I25.10 CORONARY ARTERY DISEASE INVOLVING NATIVE CORONARY ARTERY OF NATIVE HEART WITHOUT ANGINA PECTORIS: ICD-10-CM

## 2017-09-20 DIAGNOSIS — I10 ESSENTIAL HYPERTENSION: ICD-10-CM

## 2017-09-20 PROCEDURE — 99213 OFFICE O/P EST LOW 20 MIN: CPT | Performed by: NURSE PRACTITIONER

## 2017-09-20 RX ORDER — ONDANSETRON 4 MG/1
4 TABLET, FILM COATED ORAL 3 TIMES DAILY PRN
Refills: 0 | COMMUNITY
Start: 2017-09-09 | End: 2018-01-15

## 2017-09-20 RX ORDER — MEGESTROL ACETATE 40 MG/ML
400 SUSPENSION ORAL DAILY
COMMUNITY
End: 2018-01-15

## 2017-09-20 RX ORDER — CARVEDILOL 12.5 MG/1
12.5 TABLET ORAL 2 TIMES DAILY PRN
Qty: 180 TABLET | Refills: 3 | Status: SHIPPED | OUTPATIENT
Start: 2017-09-20 | End: 2018-01-15

## 2017-09-20 NOTE — PROGRESS NOTES
Subjective   Ann Marie Burkett is a 82 y.o. female     Chief Complaint   Patient presents with   • Hypertension     presents as a follow up   • Results     presents for a cath follow up       HPI    Problem List:    1.) Minor Non-Obstructive CAD per cath. 2006  1.1) Stress Test 5/25/17 - anteroapical and apical infarct with minimal saqib-infarct ischemia; preserved LVEF  1.2 ) Left heart catheter 8/3/17-this study excludes high risk anatomy and physiology with regards to.  Operative cardiac events.  EF 50%, left ventricular end-diastolic pressure 24  2.) Dilated cardiomyopathy  2.1) Echo 12/16/13 - mild LVH; EF 50-55%; DD II; hypermobile intra-atrial septum with marce septal aneurism or doppler evidence of trans-atrial flow; mild AS; mild MR and TR; trace MN; PA 35-40  2.2) Echo 5/25/17 - mild LVH; EF 45-50%; DD II; mild MR, TR and MN PA 40-45  3.) HTN  4.) Dyslipidemia  5.) Shortness of Breath with exertion  6.) SVT        7.) Vulvar CA, newly diagnosed going to get chemo and radiation at [facility]. (21 radiation tx's)     Patient is an 82-year-old female who presents today for follow-up with her daughter at her side.  She denies any chest pain, pressure, palpitations, fluttering, presyncope, syncope, orthopnea, PND or edema.  She will get dizzy at times when she gets up.  She says she's only short of breath when she exerts herself more.  Just recently finished 20 on rounds of radiation.  She supposedly going again for another procedure to have seed implants placed.  Overall she's doing very well    Patient recently had bronchial washing due to staph in her lungs.  Current Outpatient Prescriptions   Medication Sig Dispense Refill   • acetaminophen (TYLENOL) 325 MG tablet Take 325 mg by mouth Every 4 (Four) Hours As Needed.     • amiodarone (PACERONE) 200 MG tablet Take 200 mg by mouth Daily.     • aspirin 81 MG tablet Take  by mouth daily.     • carvedilol (COREG) 12.5 MG tablet Take 1 tablet by mouth 2 (Two) Times a  Day As Needed (SBP > 130 and HR > 60). Only to be taken if b/p is over 130 systolic 180 tablet 3   • citalopram (CeleXA) 20 MG tablet Take  by mouth daily.     • ferrous sulfate 325 (65 FE) MG tablet Take  by mouth daily.     • glipiZIDE (GLUCOTROL) 5 MG ER tablet Take  by mouth daily.     • levothyroxine (SYNTHROID, LEVOTHROID) 25 MCG tablet Take 25 mcg by mouth Daily.     • megestrol (MEGACE) 40 MG/ML suspension Take 400 mg by mouth Daily.     • Multiple Vitamins-Minerals (CENTRUM SILVER) tablet Take  by mouth daily.     • nitroglycerin (NITROSTAT) 0.4 MG SL tablet Place 1 tablet under the tongue every 5 (five) minutes as needed for chest pain. 30 tablet 3   • omeprazole (PriLOSEC) 20 MG capsule daily.  0   • ondansetron (ZOFRAN) 4 MG tablet Take 4 mg by mouth 3 (Three) Times a Day As Needed.  0   • polyethylene glycol (MIRALAX) packet Take 17 g by mouth Daily.     • rosuvastatin (CRESTOR) 20 MG tablet Take  by mouth.     • traMADol (ULTRAM) 50 MG tablet Take  by mouth.       No current facility-administered medications for this visit.        ALLERGIES    Review of patient's allergies indicates no known allergies.    Past Medical History:   Diagnosis Date   • Cancer    • Congestive cardiomyopathy    • Coronary artery disease    • Dyslipidemia    • History of esophageal reflux    • History of osteoarthritis    • Hyperlipidemia    • Hypertension    • Vulvar cancer        Social History     Social History   • Marital status:      Spouse name: N/A   • Number of children: N/A   • Years of education: N/A     Occupational History   • Not on file.     Social History Main Topics   • Smoking status: Never Smoker   • Smokeless tobacco: Never Used   • Alcohol use No   • Drug use: No   • Sexual activity: Defer     Other Topics Concern   • Not on file     Social History Narrative       Family History   Problem Relation Age of Onset   • Arthritis Mother    • Hypertension Mother    • Heart disease Mother    • Heart failure  "Mother    • Heart disease Father    • Hypertension Father    • Arthritis Father        Review of Systems   Constitutional: Positive for fatigue. Negative for diaphoresis.   HENT: Positive for rhinorrhea. Negative for sneezing.    Eyes: Positive for visual disturbance (wears glasses ).   Respiratory: Positive for shortness of breath (with exertion ). Negative for chest tightness.    Cardiovascular: Negative for chest pain, palpitations and leg swelling.   Gastrointestinal: Positive for nausea (little car sick on way to [place], took meds without enough food  ) and vomiting (little car sick on way to [place], took meds without enough food  ).   Endocrine: Negative.    Genitourinary: Negative for difficulty urinating.   Musculoskeletal: Positive for arthralgias, back pain and gait problem (uses a cane). Negative for neck pain.   Skin: Negative.    Allergic/Immunologic: Positive for environmental allergies.   Neurological: Positive for dizziness (if she gets up out of chair real quick ). Negative for syncope and light-headedness.   Hematological: Negative.    Psychiatric/Behavioral: Negative.        Objective   BP 98/56 (BP Location: Left arm, Patient Position: Sitting)  Pulse 84  Ht 65\" (165.1 cm)  Wt 120 lb 6.4 oz (54.6 kg)  SpO2 96%  BMI 20.04 kg/m2  Vitals:    09/20/17 1541   BP: 98/56   BP Location: Left arm   Patient Position: Sitting   Pulse: 84   SpO2: 96%   Weight: 120 lb 6.4 oz (54.6 kg)   Height: 65\" (165.1 cm)      Lab Results (most recent)     None        Physical Exam   Constitutional: She is oriented to person, place, and time. Vital signs are normal. She appears well-developed and well-nourished. She is active and cooperative.   HENT:   Head: Normocephalic.   Mouth/Throat: She has dentures (upper and lower ).   Eyes: Lids are normal.   Wears glasses    Neck: Normal carotid pulses, no hepatojugular reflux and no JVD present. Carotid bruit is not present.   Cardiovascular: Normal rate, regular " rhythm and normal heart sounds.    Pulses:       Radial pulses are 2+ on the right side, and 2+ on the left side.        Dorsalis pedis pulses are 2+ on the right side, and 2+ on the left side.        Posterior tibial pulses are 2+ on the right side, and 2+ on the left side.   No edema BLE.    Pulmonary/Chest: Effort normal and breath sounds normal.   Abdominal: Normal appearance and bowel sounds are normal.   Musculoskeletal:   Uses a cane; curvature of the spine    Neurological: She is alert and oriented to person, place, and time.   Skin: Skin is warm and dry.   Burn from radiation on back side and little on abdomen    Psychiatric: She has a normal mood and affect. Her speech is normal and behavior is normal. Judgment and thought content normal. Cognition and memory are normal.       Procedure   Procedures         Assessment/Plan      Diagnosis Plan   1. SVT (supraventricular tachycardia)  carvedilol (COREG) 12.5 MG tablet   2. Essential hypertension     3. Hyperlipidemia, unspecified hyperlipidemia type     4. Coronary artery disease involving native coronary artery of native heart without angina pectoris         Return in about 3 months (around 12/20/2017).    SVT if an patient is on amiodarone.  Hypertension-patient is doing very well.  Hyperlipidemia-patient is on Crestor.  CAD-patient is on Crestor and aspirin.  Only uses carvedilol as needed.  She will continue her medication regimen.  She will follow-up in 3 months or sooner if any changes.  I did discuss with the patient's daughter the need for yearly TSH lab draws as well as PFTs.  Patient sees Dr. Ambriz says she will get PFTs through her and her PCP has been checking her TSH regularly.

## 2017-09-20 NOTE — PATIENT INSTRUCTIONS
Hypotension  As your heart beats, it forces blood through your arteries. This force is your blood pressure. If your blood pressure is too low for you to go about your normal activities or to support the organs of your body, you have hypotension. Hypotension is also referred to as low blood pressure. When your blood pressure becomes too low, you may not get enough blood to your brain. As a result, you may feel weak, feel lightheaded, or develop a rapid heart rate. In a more severe case, you may faint.  CAUSES  Various conditions can cause hypotension. These include:  · Blood loss.  · Dehydration.  · Heart or endocrine problems.  · Pregnancy.  · Severe infection.  · Not having a well-balanced diet filled with needed nutrients.  · Severe allergic reactions (anaphylaxis).  Some medicines, such as blood pressure medicine or water pills (diuretics), may lower your blood pressure below normal. Sometimes taking too much medicine or taking medicine not as directed can cause hypotension.  TREATMENT   Hospitalization is sometimes required for hypotension if fluid or blood replacement is needed, if time is needed for medicines to wear off, or if further monitoring is needed. Treatment might include changing your diet, changing your medicines (including medicines aimed at raising your blood pressure), and use of support stockings.  HOME CARE INSTRUCTIONS   · Drink enough fluids to keep your urine clear or pale yellow.  · Take your medicines as directed by your health care provider.  · Get up slowly from reclining or sitting positions. This gives your blood pressure a chance to adjust.  · Wear support stockings as directed by your health care provider.  · Maintain a healthy diet by including nutritious food, such as fruits, vegetables, nuts, whole grains, and lean meats.  SEEK MEDICAL CARE IF:  · You have vomiting or diarrhea.  · You have a fever for more than 2-3 days.  · You feel more thirsty than usual.  · You feel weak and  tired.  SEEK IMMEDIATE MEDICAL CARE IF:   · You have chest pain or a fast or irregular heartbeat.  · You have a loss of feeling in some part of your body, or you lose movement in your arms or legs.  · You have trouble speaking.  · You become sweaty or feel lightheaded.  · You faint.  MAKE SURE YOU:   · Understand these instructions.  · Will watch your condition.  · Will get help right away if you are not doing well or get worse.     This information is not intended to replace advice given to you by your health care provider. Make sure you discuss any questions you have with your health care provider.     Document Released: 12/18/2006 Document Revised: 10/08/2014 Document Reviewed: 06/20/2014  [employer] Interactive Patient Education ©2017 [employer]

## 2017-09-21 ENCOUNTER — TELEPHONE (OUTPATIENT)
Dept: CARDIOLOGY | Facility: CLINIC | Age: 82
End: 2017-09-21

## 2017-09-21 NOTE — TELEPHONE ENCOUNTER
Patient's cardiac clearance has been faxed to [facility] dept. Of anesthesiology and pre-op dept. Fax# [phone] 09/21/17 @ 3:49 pm.

## 2017-09-22 ENCOUNTER — TELEPHONE (OUTPATIENT)
Dept: CARDIOLOGY | Facility: CLINIC | Age: 82
End: 2017-09-22

## 2017-09-22 NOTE — TELEPHONE ENCOUNTER
Follow up on patients daughter, Svitlana's call earlier today.  After speaking with PARK Ferrre regarding patient surgical clearance, patient can not stop Asprin for the five days that Svitlana was asking about.  Clearance was forwarded to [facility] and a copy was left for patient  here at the office.  I called and spoke with Svitlana and explained that a surgical clearance is required for her mother and that she can not stop Asprin. If she has any further surgical questions she can discuss the surgery and the Asprin requirements with the surgeon on her case.  Svitlana expressed understanding.

## 2017-12-20 ENCOUNTER — OFFICE VISIT (OUTPATIENT)
Dept: CARDIOLOGY | Facility: CLINIC | Age: 82
End: 2017-12-20

## 2017-12-20 VITALS
HEART RATE: 80 BPM | BODY MASS INDEX: 23.26 KG/M2 | DIASTOLIC BLOOD PRESSURE: 73 MMHG | WEIGHT: 139.6 LBS | SYSTOLIC BLOOD PRESSURE: 117 MMHG | HEIGHT: 65 IN | OXYGEN SATURATION: 95 %

## 2017-12-20 DIAGNOSIS — I25.10 CORONARY ARTERY DISEASE INVOLVING NATIVE CORONARY ARTERY OF NATIVE HEART WITHOUT ANGINA PECTORIS: Primary | ICD-10-CM

## 2017-12-20 DIAGNOSIS — I47.1 SVT (SUPRAVENTRICULAR TACHYCARDIA) (HCC): ICD-10-CM

## 2017-12-20 DIAGNOSIS — E78.5 HYPERLIPIDEMIA, UNSPECIFIED HYPERLIPIDEMIA TYPE: ICD-10-CM

## 2017-12-20 DIAGNOSIS — I42.0 CONGESTIVE CARDIOMYOPATHY (HCC): ICD-10-CM

## 2017-12-20 DIAGNOSIS — I10 ESSENTIAL HYPERTENSION: ICD-10-CM

## 2017-12-20 PROCEDURE — 99213 OFFICE O/P EST LOW 20 MIN: CPT | Performed by: NURSE PRACTITIONER

## 2017-12-20 RX ORDER — FUROSEMIDE 20 MG/1
20 TABLET ORAL DAILY
COMMUNITY

## 2017-12-20 RX ORDER — CETIRIZINE HYDROCHLORIDE 10 MG/1
10 TABLET ORAL DAILY
Refills: 3 | COMMUNITY
Start: 2017-11-08

## 2017-12-20 NOTE — PATIENT INSTRUCTIONS
Heart Failure  Heart failure is a condition in which the heart has trouble pumping blood. This means your heart does not pump blood efficiently for your body to work well. In some cases of heart failure, fluid may back up into your lungs or you may have swelling (edema) in your lower legs. Heart failure is usually a long-term (chronic) condition. It is important for you to take good care of yourself and follow your health care provider's treatment plan.  CAUSES   Some health conditions can cause heart failure. Those health conditions include:  · High blood pressure (hypertension). Hypertension causes the heart muscle to work harder than normal. When pressure in the blood vessels is high, the heart needs to pump (contract) with more force in order to circulate blood throughout the body. High blood pressure eventually causes the heart to become stiff and weak.  · Coronary artery disease (CAD). CAD is the buildup of cholesterol and fat (plaque) in the arteries of the heart. The blockage in the arteries deprives the heart muscle of oxygen and blood. This can cause chest pain and may lead to a heart attack. High blood pressure can also contribute to CAD.  · Heart attack (myocardial infarction). A heart attack occurs when one or more arteries in the heart become blocked. The loss of oxygen damages the muscle tissue of the heart. When this happens, part of the heart muscle dies. The injured tissue does not contract as well and weakens the heart's ability to pump blood.  · Abnormal heart valves. When the heart valves do not open and close properly, it can cause heart failure. This makes the heart muscle pump harder to keep the blood flowing.  · Heart muscle disease (cardiomyopathy or myocarditis). Heart muscle disease is damage to the heart muscle from a variety of causes. These can include drug or alcohol abuse, infections, or unknown reasons. These can increase the risk of heart failure.  · Lung disease. Lung disease  makes the heart work harder because the lungs do not work properly. This can cause a strain on the heart, leading it to fail.  · Diabetes. Diabetes increases the risk of heart failure. High blood sugar contributes to high fat (lipid) levels in the blood. Diabetes can also cause slow damage to tiny blood vessels that carry important nutrients to the heart muscle. When the heart does not get enough oxygen and food, it can cause the heart to become weak and stiff. This leads to a heart that does not contract efficiently.  · Other conditions can contribute to heart failure. These include abnormal heart rhythms, thyroid problems, and low blood counts (anemia).  Certain unhealthy behaviors can increase the risk of heart failure, including:  · Being overweight.  · Smoking or chewing tobacco.  · Eating foods high in fat and cholesterol.  · Abusing illicit drugs or alcohol.  · Lacking physical activity.  SYMPTOMS   Heart failure symptoms may vary and can be hard to detect. Symptoms may include:  · Shortness of breath with activity, such as climbing stairs.  · Persistent cough.  · Swelling of the feet, ankles, legs, or abdomen.  · Unexplained weight gain.  · Difficulty breathing when lying flat (orthopnea).  · Waking from sleep because of the need to sit up and get more air.  · Rapid heartbeat.  · Fatigue and loss of energy.  · Feeling light-headed, dizzy, or close to fainting.  · Loss of appetite.  · Nausea.  · Increased urination during the night (nocturia).  DIAGNOSIS   A diagnosis of heart failure is based on your history, symptoms, physical examination, and diagnostic tests. Diagnostic tests for heart failure may include:  · Echocardiography.  · Electrocardiography.  · Chest X-ray.  · Blood tests.  · Exercise stress test.  · Cardiac angiography.  · Radionuclide scans.  TREATMENT   Treatment is aimed at managing the symptoms of heart failure. Medicines, behavioral changes, or surgical intervention may be necessary to  treat heart failure.  · Medicines to help treat heart failure may include:    Angiotensin-converting enzyme (ACE) inhibitors. This type of medicine blocks the effects of a blood protein called angiotensin-converting enzyme. ACE inhibitors relax (dilate) the blood vessels and help lower blood pressure.    Angiotensin receptor blockers (ARBs). This type of medicine blocks the actions of a blood protein called angiotensin. Angiotensin receptor blockers dilate the blood vessels and help lower blood pressure.    Water pills (diuretics). Diuretics cause the kidneys to remove salt and water from the blood. The extra fluid is removed through urination. This loss of extra fluid lowers the volume of blood the heart pumps.    Beta blockers. These prevent the heart from beating too fast and improve heart muscle strength.    Digitalis. This increases the force of the heartbeat.  · Healthy behavior changes include:    Obtaining and maintaining a healthy weight.    Stopping smoking or chewing tobacco.    Eating heart-healthy foods.    Limiting or avoiding alcohol.    Stopping illicit drug use.    Physical activity as directed by your health care provider.  · Surgical treatment for heart failure may include:    A procedure to open blocked arteries, repair damaged heart valves, or remove damaged heart muscle tissue.    A pacemaker to improve heart muscle function and control certain abnormal heart rhythms.    An internal cardioverter defibrillator to treat certain serious abnormal heart rhythms.    A left ventricular assist device (LVAD) to assist the pumping ability of the heart.  HOME CARE INSTRUCTIONS   · Take medicines only as directed by your health care provider. Medicines are important in reducing the workload of your heart, slowing the progression of heart failure, and improving your symptoms.    Do not stop taking your medicine unless directed by your health care provider.    Do not skip any dose of medicine.    Refill your  prescriptions before you run out of medicine. Your medicines are needed every day.  · Engage in moderate physical activity if directed by your health care provider. Moderate physical activity can benefit some people. The elderly and people with severe heart failure should consult with a health care provider for physical activity recommendations.  · Eat heart-healthy foods. Food choices should be free of trans fat and low in saturated fat, cholesterol, and salt (sodium). Healthy choices include fresh or frozen fruits and vegetables, fish, lean meats, legumes, fat-free or low-fat dairy products, and whole grain or high fiber foods. Talk to a dietitian to learn more about heart-healthy foods.  · Limit sodium if directed by your health care provider. Sodium restriction may reduce symptoms of heart failure in some people. Talk to a dietitian to learn more about heart-healthy seasonings.  · Use healthy cooking methods. Healthy cooking methods include roasting, grilling, broiling, baking, poaching, steaming, or stir-frying. Talk to a dietitian to learn more about healthy cooking methods.  · Limit fluids if directed by your health care provider. Fluid restriction may reduce symptoms of heart failure in some people.  · Weigh yourself every day. Daily weights are important in the early recognition of excess fluid. You should weigh yourself every morning after you urinate and before you eat breakfast. Wear the same amount of clothing each time you weigh yourself. Record your daily weight. Provide your health care provider with your weight record.  · Monitor and record your blood pressure if directed by your health care provider.  · Check your pulse if directed by your health care provider.  · Lose weight if directed by your health care provider. Weight loss may reduce symptoms of heart failure in some people.  · Stop smoking or chewing tobacco. Nicotine makes your heart work harder by causing your blood vessels to constrict.  Do not use nicotine gum or patches before talking to your health care provider.  · Keep all follow-up visits as directed by your health care provider. This is important.  · Limit alcohol intake to no more than 1 drink per day for nonpregnant women and 2 drinks per day for men. One drink equals 12 ounces of beer, 5 ounces of wine, or 1½ ounces of hard liquor. Drinking more than that is harmful to your heart. Tell your health care provider if you drink alcohol several times a week. Talk with your health care provider about whether alcohol is safe for you. If your heart has already been damaged by alcohol or you have severe heart failure, drinking alcohol should be stopped completely.  · Stop illicit drug use.  · Stay up-to-date with immunizations. It is especially important to prevent respiratory infections through current pneumococcal and influenza immunizations.  · Manage other health conditions such as hypertension, diabetes, thyroid disease, or abnormal heart rhythms as directed by your health care provider.  · Learn to manage stress.  · Plan rest periods when fatigued.  · Learn strategies to manage high temperatures. If the weather is extremely hot:    Avoid vigorous physical activity.    Use air conditioning or fans or seek a cooler location.    Avoid caffeine and alcohol.    Wear loose-fitting, lightweight, and light-colored clothing.  · Learn strategies to manage cold temperatures. If the weather is extremely cold:    Avoid vigorous physical activity.    Layer clothes.    Wear mittens or gloves, a hat, and a scarf when going outside.    Avoid alcohol.  · Obtain ongoing education and support as needed.  · Participate in or seek rehabilitation as needed to maintain or improve independence and quality of life.  SEEK MEDICAL CARE IF:   · You have a rapid weight gain.  · You have increasing shortness of breath that is unusual for you.  · You are unable to participate in your usual physical activities.  · You tire  easily.  · You cough more than normal, especially with physical activity.  · You have any or more swelling in areas such as your hands, feet, ankles, or abdomen.  · You are unable to sleep because it is hard to breathe.  · You feel like your heart is beating fast (palpitations).  · You become dizzy or light-headed upon standing up.  SEEK IMMEDIATE MEDICAL CARE IF:   · You have difficulty breathing.  · There is a change in mental status such as decreased alertness or difficulty with concentration.  · You have a pain or discomfort in your chest.  · You have an episode of fainting (syncope).  MAKE SURE YOU:   · Understand these instructions.  · Will watch your condition.  · Will get help right away if you are not doing well or get worse.     This information is not intended to replace advice given to you by your health care provider. Make sure you discuss any questions you have with your health care provider.     Document Released: 12/18/2006 Document Revised: 05/03/2016 Document Reviewed: 01/17/2014  [employer] Interactive Patient Education ©2017 [employer]

## 2017-12-20 NOTE — PROGRESS NOTES
Subjective   Ann Marie Burkett is a 82 y.o. female     Chief Complaint   Patient presents with   • Shortness of Breath     presents as a follow up; patient states she has been smothering        HPI    Problem List:    1.) Minor Non-Obstructive CAD per cath. 2006  1.1) Stress Test 5/25/17 - anteroapical and apical infarct with minimal saqib-infarct ischemia; preserved LVEF  1.2 ) Left heart catheter 8/3/17-this study excludes high risk anatomy and physiology with regards to.  Operative cardiac events.  EF 50%, left ventricular end-diastolic pressure 24  2.) Dilated cardiomyopathy  2.1) Echo 12/16/13 - mild LVH; EF 50-55%; DD II; hypermobile intra-atrial septum with marce septal aneurism or doppler evidence of trans-atrial flow; mild AS; mild MR and TR; trace MS; PA 35-40  2.2) Echo 5/25/17 - mild LVH; EF 45-50%; DD II; mild MR, TR and MS PA 40-45  3.) HTN  4.) Dyslipidemia  5.) Shortness of Breath with exertion  6.) SVT        7.) Vulvar CA, newly diagnosed going to get chemo and radiation at [facility]. (21 radiation tx's)        7.1) seed implants 9/28/17       8.) Parkinson's     Patient is an 82-year-old female who presents today for follow-up with her daughter her side.  She denies any chest pain, pressure, palpitations, fluttering, presyncope, syncope, or PND.  She says she has been having shortness of breath, orthopnea, cough and edema.  Her daughter says this started approximately 2 weeks ago.  He went to her PCP at which time he did a chest x-ray that showed she had a left basilar infiltrate as well as a small effusion.  He started her on Lasix at that time.  Daughter says this has improved some however she still has some good amount of edema and shortness of breath.  Patient's weight is up quite a bit however her daughter does state that a good portion of this is actual weight gain due to the fact she started gaining weight after having her radiation treatment and eating better.  Patient's started taking her back  to have a repeat chest x-ray today.  Patient's daughter had been having patient weighed herself at night which I educated her on the importance of wearing her every morning instead for more accurate weights right after she wakes up and increase her bladder.  Daughter will continue Lasix at this time however if her mom gains more than 2 pounds overnight she is to increase the dosing.  Once we have the chest x-ray results I did advise patient's daughter that she may need to be back on the 40 mg daily.    Current Outpatient Prescriptions   Medication Sig Dispense Refill   • acetaminophen (TYLENOL) 325 MG tablet Take 325 mg by mouth Every 4 (Four) Hours As Needed.     • amiodarone (PACERONE) 200 MG tablet Take 200 mg by mouth Daily.     • aspirin 81 MG tablet Take  by mouth daily.     • carbidopa-levodopa (SINEMET)  MG per tablet Take 0.5 tablets by mouth 3 (Three) Times a Day.  12   • carvedilol (COREG) 12.5 MG tablet Take 1 tablet by mouth 2 (Two) Times a Day As Needed (SBP > 130 and HR > 60). Only to be taken if b/p is over 130 systolic 180 tablet 3   • cetirizine (zyrTEC) 10 MG tablet Take 10 mg by mouth Daily.  3   • citalopram (CeleXA) 20 MG tablet Take  by mouth daily.     • ferrous sulfate 325 (65 FE) MG tablet Take  by mouth daily.     • furosemide (LASIX) 20 MG tablet Take 20 mg by mouth Daily.     • glipiZIDE (GLUCOTROL) 5 MG ER tablet Take  by mouth daily.     • levothyroxine (SYNTHROID, LEVOTHROID) 25 MCG tablet Take 25 mcg by mouth Daily.     • megestrol (MEGACE) 40 MG/ML suspension Take 400 mg by mouth Daily.     • Multiple Vitamins-Minerals (CENTRUM SILVER) tablet Take  by mouth daily.     • nitroglycerin (NITROSTAT) 0.4 MG SL tablet Place 1 tablet under the tongue every 5 (five) minutes as needed for chest pain. 30 tablet 3   • omeprazole (PriLOSEC) 20 MG capsule daily.  0   • ondansetron (ZOFRAN) 4 MG tablet Take 4 mg by mouth 3 (Three) Times a Day As Needed.  0   • polyethylene glycol (MIRALAX)  packet Take 17 g by mouth Daily.     • rosuvastatin (CRESTOR) 20 MG tablet Take  by mouth.     • traMADol (ULTRAM) 50 MG tablet Take  by mouth.       No current facility-administered medications for this visit.        ALLERGIES    Review of patient's allergies indicates no known allergies.    Past Medical History:   Diagnosis Date   • Cancer    • Congestive cardiomyopathy    • Coronary artery disease    • Dyslipidemia    • History of esophageal reflux    • History of osteoarthritis    • Hyperlipidemia    • Hypertension    • Vulvar cancer        Social History     Social History   • Marital status:      Spouse name: N/A   • Number of children: N/A   • Years of education: N/A     Occupational History   • Not on file.     Social History Main Topics   • Smoking status: Never Smoker   • Smokeless tobacco: Never Used   • Alcohol use No   • Drug use: No   • Sexual activity: Defer     Other Topics Concern   • Not on file     Social History Narrative       Family History   Problem Relation Age of Onset   • Arthritis Mother    • Hypertension Mother    • Heart disease Mother    • Heart failure Mother    • Heart disease Father    • Hypertension Father    • Arthritis Father        Review of Systems   Constitutional: Positive for fatigue. Negative for diaphoresis.   HENT: Positive for rhinorrhea. Negative for sinus pressure and sneezing.    Eyes: Positive for visual disturbance (wears glasses).   Respiratory: Positive for cough ( after a few hours in bed, wakes up with cough and smothering ) and shortness of breath ( wakes up with cough and feels like she is smothering ). Negative for chest tightness.    Cardiovascular: Positive for leg swelling (getting better ). Negative for chest pain and palpitations.   Gastrointestinal: Positive for nausea (a little, not much, appetite little decreased ). Negative for constipation, diarrhea and vomiting.   Endocrine: Negative.    Genitourinary: Negative for difficulty urinating.  "  Musculoskeletal: Positive for arthralgias and back pain (curvature of the spine ). Negative for myalgias and neck pain.   Skin: Negative.    Allergic/Immunologic: Positive for environmental allergies.   Neurological: Positive for dizziness (sometimes with shortness of breath, but not a lot ). Negative for syncope, light-headedness and headaches.   Hematological: Does not bruise/bleed easily.   Psychiatric/Behavioral: The patient is nervous/anxious.        Objective   /73 (BP Location: Left arm, Patient Position: Sitting)  Pulse 80  Ht 165.1 cm (65\")  Wt 63.3 kg (139 lb 9.6 oz)  SpO2 95%  BMI 23.23 kg/m2  Vitals:    12/20/17 1508   BP: 117/73   BP Location: Left arm   Patient Position: Sitting   Pulse: 80   SpO2: 95%   Weight: 63.3 kg (139 lb 9.6 oz)   Height: 165.1 cm (65\")      Lab Results (most recent)     None        Physical Exam   Constitutional: She is oriented to person, place, and time. Vital signs are normal. She appears well-developed and well-nourished. She is active and cooperative.   HENT:   Head: Normocephalic.   Mouth/Throat: She has dentures (upper and lower ).   Eyes: Lids are normal.   Wears glasses    Neck: Normal carotid pulses, no hepatojugular reflux and no JVD present. Carotid bruit is not present.   Cardiovascular: Normal rate, regular rhythm and normal heart sounds.    Pulses:       Radial pulses are 2+ on the right side, and 2+ on the left side.        Dorsalis pedis pulses are 2+ on the right side, and 2+ on the left side.        Posterior tibial pulses are 2+ on the right side, and 2+ on the left side.   1+ edema BLE.    Pulmonary/Chest: Effort normal. She has rales in the right lower field and the left lower field.   Abdominal: Normal appearance and bowel sounds are normal.   Musculoskeletal:   Uses a cane; curvature of the spine    Neurological: She is alert and oriented to person, place, and time.   Skin: Skin is warm, dry and intact.   Psychiatric: She has a normal mood " and affect. Her speech is normal and behavior is normal. Judgment and thought content normal. Cognition and memory are normal.       Procedure   Procedures         Assessment/Plan      Diagnosis Plan   1. Coronary artery disease involving native coronary artery of native heart without angina pectoris     2. Hyperlipidemia, unspecified hyperlipidemia type     3. Essential hypertension     4. Congestive cardiomyopathy     5. SVT (supraventricular tachycardia)  Cardiac Event Monitor       Return in about 8 weeks (around 2/14/2018).       CAD-patient is on aspirin, beta and statin.  Hyperlipidemia-patient is on Crestor monitor by PCP.  Hypertension-patient is doing well.  Congestive cardiomyopathy-patient will continue her Lasix.  She will also be getting a repeat chest x-ray that was ordered by her PCP.  She will follow-up in 8 weeks or sooner if any changes.  SVT-I will have patient wear monitor for 2 weeks to make sure there is no other episodes.    Electronically signed by:

## 2017-12-21 ENCOUNTER — TELEPHONE (OUTPATIENT)
Dept: CARDIOLOGY | Facility: CLINIC | Age: 82
End: 2017-12-21

## 2017-12-21 NOTE — TELEPHONE ENCOUNTER
Cristal called back.  She understands the purpose of Ann Marie wearing a monitor again and is in agreement.  She says Dr. Leger did write for low dose Xanax for her mom and I confirmed very minor interaction with amiodarone.

## 2018-01-12 ENCOUNTER — TELEPHONE (OUTPATIENT)
Dept: CARDIOLOGY | Facility: CLINIC | Age: 83
End: 2018-01-12

## 2018-01-12 NOTE — TELEPHONE ENCOUNTER
DAUGHTER THINKS SHE MAY NOT HAVE TAKEN HER AMIODARONE DOSE LAST NIGHT. PER DONALD VAZQUEZ NP HAVE PATIENT TAKE DAILY DOSE TODAY AS USUAL. DAUGHTER AWARE. STONE LINDON

## 2018-01-15 ENCOUNTER — OFFICE VISIT (OUTPATIENT)
Dept: CARDIOLOGY | Facility: CLINIC | Age: 83
End: 2018-01-15

## 2018-01-15 VITALS
HEIGHT: 65 IN | SYSTOLIC BLOOD PRESSURE: 120 MMHG | BODY MASS INDEX: 22.56 KG/M2 | HEART RATE: 72 BPM | DIASTOLIC BLOOD PRESSURE: 79 MMHG | OXYGEN SATURATION: 100 % | WEIGHT: 135.4 LBS

## 2018-01-15 DIAGNOSIS — I50.20 SYSTOLIC CONGESTIVE HEART FAILURE, UNSPECIFIED CONGESTIVE HEART FAILURE CHRONICITY: ICD-10-CM

## 2018-01-15 DIAGNOSIS — R06.02 SHORTNESS OF BREATH: Primary | ICD-10-CM

## 2018-01-15 DIAGNOSIS — I42.8 OTHER CARDIOMYOPATHY (HCC): ICD-10-CM

## 2018-01-15 PROBLEM — I42.9 MYOCARDIOPATHY (HCC): Status: ACTIVE | Noted: 2018-01-15

## 2018-01-15 PROCEDURE — 99214 OFFICE O/P EST MOD 30 MIN: CPT | Performed by: PHYSICIAN ASSISTANT

## 2018-01-15 RX ORDER — CARVEDILOL 6.25 MG/1
6.25 TABLET ORAL 2 TIMES DAILY WITH MEALS
Qty: 60 TABLET | Refills: 11 | Status: SHIPPED | OUTPATIENT
Start: 2018-01-15 | End: 2018-01-25 | Stop reason: SDUPTHER

## 2018-01-15 RX ORDER — SENNOSIDES 8.6 MG
650 CAPSULE ORAL 2 TIMES DAILY
COMMUNITY

## 2018-01-15 RX ORDER — LEVOTHYROXINE SODIUM 0.05 MG/1
50 TABLET ORAL DAILY
COMMUNITY
Start: 2017-12-18

## 2018-01-15 RX ORDER — ALPRAZOLAM 0.25 MG/1
0.25 TABLET ORAL NIGHTLY
Refills: 1 | COMMUNITY
Start: 2017-12-20

## 2018-01-15 RX ORDER — CYANOCOBALAMIN 1000 UG/ML
INJECTION, SOLUTION INTRAMUSCULAR; SUBCUTANEOUS
Refills: 1 | COMMUNITY
Start: 2018-01-04

## 2018-01-15 NOTE — PROGRESS NOTES
Problem list     Subjective   Ann Marie Burkett is a 82 y.o. female     Chief Complaint   Patient presents with   • Coronary Artery Disease     Here for hosp. f/u   • Cardiomyopathy   • Hypertension   • Hyperlipidemia   • Rapid Heart Rate       HPI    Problem List:     1.) Minor Non-Obstructive CAD per cath. 2006  1.1) Stress Test 5/25/17 - anteroapical and apical infarct with minimal saqib-infarct ischemia; preserved LVEF  1.2 ) Left heart catheter 8/3/17-this study excludes high risk anatomy and physiology with regards to.  Operative cardiac events.  EF 50%, left ventricular end-diastolic pressure 24  2.) Dilated cardiomyopathy  2.1) Echo 12/16/13 - mild LVH; EF 50-55%; DD II; hypermobile intra-atrial septum with marce septal aneurism or doppler evidence of trans-atrial flow; mild AS; mild MR and TR; trace TN; PA 35-40  2.2) Echo 5/25/17 - mild LVH; EF 45-50%; DD II; mild MR, TR and TN PA 40-45  3.) HTN  4.) Dyslipidemia  5.) Shortness of Breath with exertion  6.) SVT        7.) Vulvar CA, newly diagnosed going to get chemo and radiation at [facility]. (21 radiation tx's)        7.1) seed implants 9/28/17       8.) Parkinson's     Patient is a 89-year-old female that presents back for follow-up.  Patient recently was admitted [facility].  She had significant shortness of breath and was found to be in congestive heart failure.  EF dropped to 20%.  She's been undergoing therapy through the [facility] for vulvar cancer.  She was discharged on Lasix therapy.  They have been titrating dose based on patient's weight and she's been doing better.    She does not have chest pain or pressure.  Dyspnea is moderate severe.  She complains of no PND orthopnea.  No palpitations or dysrhythmic symptoms.  She is currently wearing event monitor.  Otherwise is doing well      Outpatient Encounter Prescriptions as of 1/15/2018   Medication Sig Dispense Refill   • acetaminophen (TYLENOL) 650 MG 8 hr tablet Take 1,300 mg by  mouth 2 (Two) Times a Day.     • ALPRAZolam (XANAX) 0.25 MG tablet Every Night.  1   • amiodarone (PACERONE) 200 MG tablet Take 200 mg by mouth Daily.     • aspirin 81 MG tablet Take  by mouth daily.     • carbidopa-levodopa (SINEMET)  MG per tablet Take 1 tablet by mouth 2 (Two) Times a Day.  12   • cetirizine (zyrTEC) 10 MG tablet Take 10 mg by mouth Daily.  3   • citalopram (CeleXA) 20 MG tablet Take 20 mg by mouth 2 (Two) Times a Day.     • cyanocobalamin 1000 MCG/ML injection INJECT 1 ML ONCE MONTHLY  1   • ferrous sulfate 325 (65 FE) MG tablet Take  by mouth daily.     • furosemide (LASIX) 20 MG tablet Take 20 mg by mouth Daily. Tales extra 20 mg for >2.2 lb. Weight gain     • levothyroxine (SYNTHROID, LEVOTHROID) 50 MCG tablet Daily.     • Multiple Vitamins-Minerals (CENTRUM SILVER) tablet Take  by mouth daily.     • omeprazole (PriLOSEC) 20 MG capsule daily.  0   • polyethylene glycol (MIRALAX) packet Take 17 g by mouth Daily As Needed.     • rosuvastatin (CRESTOR) 20 MG tablet Take  by mouth.     • traMADol (ULTRAM) 50 MG tablet Take  by mouth.     • [DISCONTINUED] carvedilol (COREG) 12.5 MG tablet Take 1 tablet by mouth 2 (Two) Times a Day As Needed (SBP > 130 and HR > 60). Only to be taken if b/p is over 130 systolic 180 tablet 3   • carvedilol (COREG) 6.25 MG tablet Take 1 tablet by mouth 2 (Two) Times a Day With Meals. 60 tablet 11   • nitroglycerin (NITROSTAT) 0.4 MG SL tablet Place 1 tablet under the tongue every 5 (five) minutes as needed for chest pain. 30 tablet 3   • [DISCONTINUED] acetaminophen (TYLENOL) 325 MG tablet Take 325 mg by mouth Every 4 (Four) Hours As Needed.     • [DISCONTINUED] glipiZIDE (GLUCOTROL) 5 MG ER tablet Take  by mouth daily.     • [DISCONTINUED] levothyroxine (SYNTHROID, LEVOTHROID) 25 MCG tablet Take 25 mcg by mouth Daily.     • [DISCONTINUED] megestrol (MEGACE) 40 MG/ML suspension Take 400 mg by mouth Daily.     • [DISCONTINUED] ondansetron (ZOFRAN) 4 MG tablet  Take 4 mg by mouth 3 (Three) Times a Day As Needed.  0     No facility-administered encounter medications on file as of 1/15/2018.        Review of patient's allergies indicates no known allergies.    Past Medical History:   Diagnosis Date   • Cancer    • Congestive cardiomyopathy    • Coronary artery disease    • Dyslipidemia    • History of esophageal reflux    • History of osteoarthritis    • Hyperlipidemia    • Hypertension    • Vulvar cancer        Social History     Social History   • Marital status:      Spouse name: N/A   • Number of children: N/A   • Years of education: N/A     Occupational History   • Not on file.     Social History Main Topics   • Smoking status: Never Smoker   • Smokeless tobacco: Never Used   • Alcohol use No   • Drug use: No   • Sexual activity: Defer     Other Topics Concern   • Not on file     Social History Narrative       Family History   Problem Relation Age of Onset   • Arthritis Mother    • Hypertension Mother    • Heart disease Mother    • Heart failure Mother    • Heart disease Father    • Hypertension Father    • Arthritis Father        Review of Systems   Constitutional: Positive for fatigue.   HENT: Positive for hearing loss.    Eyes: Positive for visual disturbance (glasses).   Respiratory: Positive for shortness of breath (02 at 2-3 L via N/C).    Cardiovascular: Positive for leg swelling. Negative for chest pain and palpitations.   Gastrointestinal: Negative.    Endocrine: Negative.    Genitourinary: Negative.    Musculoskeletal: Positive for arthralgias, gait problem (in W/C) and myalgias.   Skin: Negative.    Allergic/Immunologic: Positive for environmental allergies.   Neurological: Positive for light-headedness (occas.).   Hematological: Bruises/bleeds easily.   Psychiatric/Behavioral: Positive for sleep disturbance.       Objective   Vitals:    01/15/18 1517   BP: 120/79   BP Location: Left arm   Patient Position: Sitting   Pulse: 72   SpO2: 100%   Weight:  "61.4 kg (135 lb 6.4 oz)   Height: 165.1 cm (65\")      /79 (BP Location: Left arm, Patient Position: Sitting)  Pulse 72  Ht 165.1 cm (65\")  Wt 61.4 kg (135 lb 6.4 oz)  SpO2 100% Comment: 02 at 2-3 L via N/C  BMI 22.53 kg/m2    Lab Results (most recent)     None          Physical Exam   Constitutional: She is oriented to person, place, and time. She appears well-developed and well-nourished. No distress.   HENT:   Head: Normocephalic and atraumatic.   Eyes: EOM are normal. Pupils are equal, round, and reactive to light.   Neck: JVD present.   Cardiovascular: Normal rate, regular rhythm and normal heart sounds.  Exam reveals no gallop and no friction rub.    No murmur heard.  Pulmonary/Chest: Effort normal and breath sounds normal. No respiratory distress. She has no wheezes. She has no rales. She exhibits no tenderness.   Musculoskeletal: Normal range of motion. She exhibits no edema.   Neurological: She is alert and oriented to person, place, and time. No cranial nerve deficit.   Skin: Skin is warm and dry. No rash noted. No erythema. No pallor.   Psychiatric: She has a normal mood and affect. Her behavior is normal.   Nursing note and vitals reviewed.      Procedure   Procedures       Assessment/Plan     Problems Addressed this Visit        Cardiovascular and Mediastinum    Myocardiopathy    Relevant Medications    carvedilol (COREG) 6.25 MG tablet    Other Relevant Orders    Nuclear Medicine Cardiac Blood Pool [facility]    Systolic congestive heart failure    Relevant Medications    carvedilol (COREG) 6.25 MG tablet    Other Relevant Orders    Nuclear Medicine Cardiac Blood Pool [facility]       Respiratory    Shortness of breath - Primary    Relevant Orders    Nuclear Medicine Cardiac Blood Pool [facility]            Recommendation  1.  Patient doing poorly at this time.  She is euvolemic by examination today.  Lungs are clear.  She does have mild lower extremity edema.  I would like to obtain a " MUGA scan to confirm systolic function.  2.  Patient had been taking Coreg 12.5 mg twice a day as needed.  I am unsure that dose.  I'm starting her on 6.25 twice a day.  I have give her clear instructions to avoid if systolic blood pressures less than 120 or heart rate is less than 50.  They verbalize understanding and knowledge.  3.  We have discussed LifeVest.  They are agreeable to wear.  We will schedule for LifeVest.  We will see her back for follow-up after testing.  Follow-up primary as scheduled         Electronically signed by:

## 2018-01-18 ENCOUNTER — HOSPITAL ENCOUNTER (OUTPATIENT)
Dept: CARDIOLOGY | Facility: HOSPITAL | Age: 83
Discharge: HOME | End: 2018-01-18

## 2018-01-18 ENCOUNTER — DOCUMENTATION (OUTPATIENT)
Dept: CARDIOLOGY | Facility: CLINIC | Age: 83
End: 2018-01-18

## 2018-01-18 ENCOUNTER — OUTSIDE FACILITY SERVICE (OUTPATIENT)
Dept: CARDIOLOGY | Facility: CLINIC | Age: 83
End: 2018-01-18

## 2018-01-18 ENCOUNTER — TELEPHONE (OUTPATIENT)
Dept: CARDIOLOGY | Facility: CLINIC | Age: 83
End: 2018-01-18

## 2018-01-18 DIAGNOSIS — R06.02 SHORTNESS OF BREATH: ICD-10-CM

## 2018-01-18 DIAGNOSIS — I42.8 OTHER CARDIOMYOPATHY (HCC): ICD-10-CM

## 2018-01-18 DIAGNOSIS — I50.20 SYSTOLIC CONGESTIVE HEART FAILURE, UNSPECIFIED CONGESTIVE HEART FAILURE CHRONICITY: ICD-10-CM

## 2018-01-18 LAB
MAXIMAL PREDICTED HEART RATE: 140 BPM
STRESS TARGET HR: 119 BPM

## 2018-01-18 PROCEDURE — 0 TECHNETIUM LABELED RED BLOOD CELLS: Performed by: INTERNAL MEDICINE

## 2018-01-18 PROCEDURE — 25010000002 HEPARIN FLUSH (PORCINE) 100 UNIT/ML SOLUTION: Performed by: INTERNAL MEDICINE

## 2018-01-18 PROCEDURE — 78472 GATED HEART PLANAR SINGLE: CPT

## 2018-01-18 PROCEDURE — A9560 TC99M LABELED RBC: HCPCS | Performed by: INTERNAL MEDICINE

## 2018-01-18 RX ADMIN — HEPARIN SODIUM (PORCINE) LOCK FLUSH IV SOLN 100 UNIT/ML 10 UNITS: 100 SOLUTION at 16:00

## 2018-01-18 RX ADMIN — TECHNETIUM TC 99M-LABELED RED BLOOD CELLS 1 DOSE: KIT at 16:00

## 2018-01-18 NOTE — TELEPHONE ENCOUNTER
Patient EF 15% by MUGA today. Reviewed by PARK Gupta and pt has to continue wearing her life vest. Patient has a follow up apt on 01/25/17 and will need to keep that apt. Advised pt son and daughter that she needs to wear life vest at all times. Son, Rigo, had questioned about taking vest off while they couldn't be there in case she has a shock or the machine malfunctions. I informed him that she has got to wear vest at all times and if she has a shock then they need to call ambulance and proceed to ER. If machine malfunctions they can call ZOLL or call one of the children to come look at machine but we cannot advise to take vest off if they cannot be there at all times.. Patient son, Rigo  and daughter Cristal verbalized understanding.

## 2018-01-18 NOTE — PROGRESS NOTES
LIFEVEST APPROVED PER [facility]. VALID TILL 2-17-18, AUTH. # [id number]. PAULA WITH [facility]OLL AWARE. PH,STONEN

## 2018-01-18 NOTE — TELEPHONE ENCOUNTER
Daughter called stating that she has flu and will not be able to make it to her MUGA scan but her brother was coming with Ms. Burkett today. Daughter called to see if she will need to wear her heart monitor since they came out to fit her for life vest today. I spoke with Son downstairs and informed him that she will need to continue to wear her heart monitor and lifevest. Jero, "[id number] tech., will let PARK Gupta aware of MUGA results before letting Ms. Burkett leave. If EF has improved we will take lifevest off per PARK Gupta.

## 2018-01-22 ENCOUNTER — OUTSIDE FACILITY SERVICE (OUTPATIENT)
Dept: CARDIOLOGY | Facility: CLINIC | Age: 83
End: 2018-01-22

## 2018-01-22 ENCOUNTER — TELEPHONE (OUTPATIENT)
Dept: CARDIOLOGY | Facility: CLINIC | Age: 83
End: 2018-01-22

## 2018-01-22 PROCEDURE — 93228 REMOTE 30 DAY ECG REV/REPORT: CPT | Performed by: INTERNAL MEDICINE

## 2018-01-23 ENCOUNTER — OFFICE VISIT (OUTPATIENT)
Dept: CARDIOLOGY | Facility: CLINIC | Age: 83
End: 2018-01-23

## 2018-01-23 VITALS
HEART RATE: 82 BPM | WEIGHT: 127.6 LBS | HEIGHT: 65 IN | BODY MASS INDEX: 21.26 KG/M2 | DIASTOLIC BLOOD PRESSURE: 76 MMHG | OXYGEN SATURATION: 94 % | SYSTOLIC BLOOD PRESSURE: 108 MMHG

## 2018-01-23 DIAGNOSIS — I42.0 DILATED CARDIOMYOPATHY (HCC): Primary | ICD-10-CM

## 2018-01-23 DIAGNOSIS — I50.22 CHRONIC SYSTOLIC CONGESTIVE HEART FAILURE (HCC): ICD-10-CM

## 2018-01-23 DIAGNOSIS — I25.119 CORONARY ARTERY DISEASE INVOLVING NATIVE CORONARY ARTERY OF NATIVE HEART WITH ANGINA PECTORIS (HCC): ICD-10-CM

## 2018-01-23 DIAGNOSIS — I48.0 PAROXYSMAL ATRIAL FIBRILLATION (HCC): ICD-10-CM

## 2018-01-23 PROCEDURE — 99214 OFFICE O/P EST MOD 30 MIN: CPT | Performed by: PHYSICIAN ASSISTANT

## 2018-01-23 NOTE — PROGRESS NOTES
Problem list     Subjective   Ann Marie Burkett is a 83 y.o. female     Chief Complaint   Patient presents with   • Follow-up     patient appears in office today for follow up test results    • Shortness of Breath   • Coronary Artery Disease   Problem List:      1.) Minor Non-Obstructive CAD per cath. 2006  1.1) Stress Test 5/25/17 - anteroapical and apical infarct with minimal saqib-infarct ischemia; preserved LVEF  1.2 ) Left heart cath, 8/3/17-Non obstructive CAD noted.  Medical management was recommended.  EF 50%, left ventricular end-diastolic pressure 24  2.) Dilated cardiomyopathy  2.1) Echo 12/16/13 - mild LVH; EF 50-55%; DD II; hypermobile intra-atrial septum with marce septal aneurism or doppler evidence of trans-atrial flow; mild AS; mild MR and TR; trace [place]; PA 35-40  2.2) Echo, late-2017, indicating a decline in EF to 20-25%.   Echo was performed in the setting of CHF.  2.3) MUGA, 01/18, indicating an EF of 15%.  3.) HTN  4.) Dyslipidemia  5.) Shortness of Breath with exertion  6.) PAF.       7.) Vulvar CA, newly diagnosed going to get chemo and radiation at [facility]. (21 radiation tx's)        7.1) seed implants 9/28/17       8.) Parkinson's     HPI  The patient presents back for evaluation of MUGA scan results.  MUGA was scheduled because of her significant decline in systolic function by echocardiogram in late 2017.  She has significant decline by the echo, EF down to 20-25%.  She had studies and mid 2017 was suggested low normal to minimally depressed systolic function at best.  She did have a catheterization mid 2017 which indicated nonobstructive disease.  We reviewed with her today that this is not an ischemic cardiomyopathy process.  The patient has had A. fib in the past.  This has been reasonably well-controlled with amiodarone up until recently.  The patient has started having breakthrough rhythm disturbance issues consistent with A. fib.  She had an event monitor to, I do not have results of  those at this time.  The patient has severe weakness.  She has severe dyspnea.  She tells me that she feels that she is going to die soon.  She has chest tightness.  She has frequent episodes of tachycardia dysrhythmic episodes.  We did schedule the patient for a LifeVest.  This was severely uncomfortable and she has refused to wear that.  She acknowledges the risks, as does family members who are with her today, of sudden cardiac death with her severe nonischemic cardiomyopathy.  The patient has had PND and orthopnea episodes.  She has had increasing edema and dyspnea.  These are consistent with her previously experienced mild failure symptoms.  Unfortunately, the patient does not have enough strength to do daily weights.  The family has been titrating her diuretic regimen according to symptoms.  There are no further issues at this time.    Current Outpatient Prescriptions   Medication Sig Dispense Refill   • acetaminophen (TYLENOL) 650 MG 8 hr tablet Take 650 mg by mouth 2 (Two) Times a Day.     • ALPRAZolam (XANAX) 0.25 MG tablet Take 0.25 mg by mouth Every Night.  1   • amiodarone (PACERONE) 200 MG tablet Take 200 mg by mouth Daily.     • aspirin 81 MG tablet Take 81 mg by mouth Daily.     • carvedilol (COREG) 6.25 MG tablet Take 1 tablet by mouth 2 (Two) Times a Day With Meals. 60 tablet 11   • cetirizine (zyrTEC) 10 MG tablet Take 10 mg by mouth Daily.  3   • citalopram (CeleXA) 20 MG tablet Take 20 mg by mouth 2 (Two) Times a Day.     • cyanocobalamin 1000 MCG/ML injection INJECT 1 ML ONCE MONTHLY  1   • ferrous sulfate 325 (65 FE) MG tablet Take 325 mg by mouth Daily With Breakfast.     • furosemide (LASIX) 20 MG tablet Take 20 mg by mouth Daily. Tales extra 20 mg for >2.2 lb. Weight gain     • levothyroxine (SYNTHROID, LEVOTHROID) 50 MCG tablet Take 50 mcg by mouth Daily.     • Multiple Vitamins-Minerals (CENTRUM SILVER) tablet Take  by mouth daily.     • nitroglycerin (NITROSTAT) 0.4 MG SL tablet Place 1  tablet under the tongue every 5 (five) minutes as needed for chest pain. 30 tablet 3   • omeprazole (PriLOSEC) 20 MG capsule Take 20 mg by mouth Daily.  0   • polyethylene glycol (MIRALAX) packet Take 17 g by mouth Daily As Needed.     • rosuvastatin (CRESTOR) 20 MG tablet Take 20 mg by mouth Daily.     • traMADol (ULTRAM) 50 MG tablet Take 50 mg by mouth 2 (Two) Times a Day.       No current facility-administered medications for this visit.        Review of patient's allergies indicates no known allergies.    Past Medical History:   Diagnosis Date   • Cancer    • Congestive cardiomyopathy    • Coronary artery disease    • Dyslipidemia    • History of esophageal reflux    • History of osteoarthritis    • Hyperlipidemia    • Hypertension    • Vulvar cancer        Social History     Social History   • Marital status:      Spouse name: N/A   • Number of children: N/A   • Years of education: N/A     Occupational History   • Not on file.     Social History Main Topics   • Smoking status: Never Smoker   • Smokeless tobacco: Never Used   • Alcohol use No   • Drug use: No   • Sexual activity: Defer     Other Topics Concern   • Not on file     Social History Narrative       Family History   Problem Relation Age of Onset   • Arthritis Mother    • Hypertension Mother    • Heart disease Mother    • Heart failure Mother    • Heart disease Father    • Hypertension Father    • Arthritis Father        Review of Systems   Constitutional: Positive for fatigue (incrase in fatigue).   HENT: Negative.  Negative for congestion, rhinorrhea, sneezing and sore throat.    Eyes: Positive for visual disturbance (wears glasses daily).   Respiratory: Positive for shortness of breath (always SOA; worse on exertion ). Negative for apnea, cough, chest tightness and wheezing.    Cardiovascular: Positive for leg swelling (BLE swelling edema in legs/feet/hands). Negative for chest pain (denies CP) and palpitations (denies palpitations).  "  Gastrointestinal: Negative.  Negative for abdominal distention, abdominal pain, nausea and vomiting.   Endocrine: Negative.  Negative for cold intolerance, heat intolerance, polyphagia and polyuria.   Genitourinary: Negative.  Negative for difficulty urinating, frequency and urgency.   Musculoskeletal: Positive for arthralgias (hands/knees/back ) and back pain (due to arthritis). Negative for myalgias, neck pain and neck stiffness.   Skin: Positive for rash (on tail bone). Negative for wound.   Allergic/Immunologic: Negative.  Negative for environmental allergies and food allergies.   Neurological: Negative.  Negative for dizziness, weakness, light-headedness and headaches.   Hematological: Bruises/bleeds easily (bruises and bleeds easily).   Psychiatric/Behavioral: Negative.  Negative for agitation, confusion and sleep disturbance (denies waking up smothering/SOA). The patient is not nervous/anxious.        Objective   Vitals:    01/23/18 1359   BP: 108/76   BP Location: Left arm   Patient Position: Sitting   Pulse: 82   SpO2: 94%   Weight: 57.9 kg (127 lb 9.6 oz)   Height: 165.1 cm (65\")      /76 (BP Location: Left arm, Patient Position: Sitting)  Pulse 82  Ht 165.1 cm (65\")  Wt 57.9 kg (127 lb 9.6 oz)  SpO2 94%  BMI 21.23 kg/m2   Lab Results (most recent)     None        Physical Exam   Constitutional: She is oriented to person, place, and time. She appears well-nourished. She appears distressed (Patient appears severly weak.  She appears chronically ill.).   HENT:   Head: Normocephalic and atraumatic.   Eyes: EOM are normal. Pupils are equal, round, and reactive to light.   Neck: JVD present.   Cardiovascular: Normal rate and regular rhythm.  Exam reveals no gallop and no friction rub.    Murmur (Soft MOHSEN.) heard.  Pulmonary/Chest: Effort normal. No respiratory distress. She has no wheezes. She has rales. She exhibits no tenderness.   Musculoskeletal: Normal range of motion. She exhibits no edema. "   Neurological: She is alert and oriented to person, place, and time. No cranial nerve deficit.   Skin: Skin is warm and dry. No rash noted. No erythema. No pallor.   Psychiatric: She has a normal mood and affect. Her behavior is normal.   Nursing note and vitals reviewed.        Procedure   Procedures       Assessment/Plan      Diagnosis Plan   1. Dilated cardiomyopathy     2. Paroxysmal atrial fibrillation     3. Coronary artery disease involving native coronary artery of native heart with angina pectoris     4. Chronic systolic congestive heart failure         I have asked the patient again to consider LifeVest therapy.  She confirms to me today, as does family members, that the LifeVest is very bothersome.  She acknowledges the risk of sudden cardiac death.  She accepts that risk.  She does not want to wear the LifeVest any further.    We also discussed consideration of failure precautions.  We discussed sodium restriction.  I discussed daily weights.  The patient cannot do daily weights.  The family tells me that she is so weak that she cannot even stand do daily weights at this time.  We have discussed titration of diuretic regimen for failure symptoms.  The family acknowledges that her understanding of this.    We have decreased carvedilol to 6.25 mg twice a day for now.  I'm going to institute Entresto because of her severe nonischemic cardiomyopathy.  Hopefully she may derive some benefit from this new therapy.  We discussed appropriate blood pressure and heart rate ranges at which time medication should be held.     I have also discussed with family consideration for ICD should the patient made appropriate timeframes.  The patient and family members confirm with me that they do not want to pursue ICD.    For now, I will go to slowly titrate medical regimen.  She and family members are well versed on titration of diuretic therapy.  The patient and family members wants no further cardiac evaluation or workup  at this time.  We will treat her medically.  For any issues, she will call to us.  I would like to see her back in one month and we can reevaluate overall clinical course and clinical scenario at that time.               Electronically signed by:

## 2018-01-25 ENCOUNTER — CLINICAL SUPPORT (OUTPATIENT)
Dept: CARDIOLOGY | Facility: CLINIC | Age: 83
End: 2018-01-25

## 2018-01-25 VITALS — DIASTOLIC BLOOD PRESSURE: 61 MMHG | HEART RATE: 61 BPM | SYSTOLIC BLOOD PRESSURE: 97 MMHG | OXYGEN SATURATION: 99 %

## 2018-01-25 DIAGNOSIS — I47.1 SVT (SUPRAVENTRICULAR TACHYCARDIA) (HCC): ICD-10-CM

## 2018-01-25 DIAGNOSIS — I50.20 SYSTOLIC CONGESTIVE HEART FAILURE, UNSPECIFIED CONGESTIVE HEART FAILURE CHRONICITY: ICD-10-CM

## 2018-01-25 DIAGNOSIS — R53.82 CHRONIC FATIGUE: ICD-10-CM

## 2018-01-25 DIAGNOSIS — R06.02 SHORTNESS OF BREATH: ICD-10-CM

## 2018-01-25 DIAGNOSIS — I42.0 CONGESTIVE CARDIOMYOPATHY (HCC): Primary | ICD-10-CM

## 2018-01-25 DIAGNOSIS — I95.0 IDIOPATHIC HYPOTENSION: ICD-10-CM

## 2018-01-25 DIAGNOSIS — I25.10 MULTIPLE VESSEL CORONARY ARTERY DISEASE: ICD-10-CM

## 2018-01-25 PROCEDURE — 93000 ELECTROCARDIOGRAM COMPLETE: CPT | Performed by: PHYSICIAN ASSISTANT

## 2018-01-25 RX ORDER — CARVEDILOL 3.12 MG/1
3.12 TABLET ORAL 2 TIMES DAILY WITH MEALS
Start: 2018-01-25

## 2018-01-25 RX ORDER — AMIODARONE HYDROCHLORIDE 200 MG/1
200 TABLET ORAL DAILY
Start: 2018-01-25

## 2018-01-25 NOTE — PROGRESS NOTES
An nMarie Burkett  1935 1/25/2018   ?   Chief Complaint   Patient presents with   • Follow-up     BP check and EKG due to start on amiodarone start       ?   HPI:   ? patient presents today for EKG due to increase of amiodarone to 200 mg BID and symptom check per ELISEO Boo orders. Patient was started on entresto 24-26 01/23/18 and coreg was decreased to 6.25 mg BID. The daughter, Cristal, has been dosing her medication according to BP and HR. She states that ELISEO Boyle wanted her to only take coreg if her systolic was > 120 and HR > 90. ELISEO Boo wanted the coreg to be decreased to 6.25 BID and take everyday along with added entresto 24-26. The patient has only took coreg 6.25 in the AM past 2 days and 3.125 mg in the PM the past 2 days. Was only given her entresto dose the past 2 days at 11 am with no evening dose. Pt is very lethargic, fatigue, shortness of breath, chest pain, hypotensive as described at last visit on 01/23/18.   ?   ?     Current Outpatient Prescriptions:   •  acetaminophen (TYLENOL) 650 MG 8 hr tablet, Take 650 mg by mouth 2 (Two) Times a Day., Disp: , Rfl:   •  ALPRAZolam (XANAX) 0.25 MG tablet, Take 0.25 mg by mouth Every Night., Disp: , Rfl: 1  •  amiodarone (PACERONE) 200 MG tablet, Take 200 mg by mouth Daily., Disp: , Rfl:   •  aspirin 81 MG tablet, Take 81 mg by mouth Daily., Disp: , Rfl:   •  carvedilol (COREG) 6.25 MG tablet, Take 1 tablet by mouth 2 (Two) Times a Day With Meals., Disp: 60 tablet, Rfl: 11  •  cetirizine (zyrTEC) 10 MG tablet, Take 10 mg by mouth Daily., Disp: , Rfl: 3  •  citalopram (CeleXA) 20 MG tablet, Take 20 mg by mouth 2 (Two) Times a Day., Disp: , Rfl:   •  cyanocobalamin 1000 MCG/ML injection, INJECT 1 ML ONCE MONTHLY, Disp: , Rfl: 1  •  ferrous sulfate 325 (65 FE) MG tablet, Take 325 mg by mouth Daily With Breakfast., Disp: , Rfl:   •  furosemide (LASIX) 20 MG tablet, Take 20 mg by mouth Daily. Tales extra 20 mg for >2.2 lb. Weight  gain, Disp: , Rfl:   •  levothyroxine (SYNTHROID, LEVOTHROID) 50 MCG tablet, Take 50 mcg by mouth Daily., Disp: , Rfl:   •  Multiple Vitamins-Minerals (CENTRUM SILVER) tablet, Take  by mouth daily., Disp: , Rfl:   •  nitroglycerin (NITROSTAT) 0.4 MG SL tablet, Place 1 tablet under the tongue every 5 (five) minutes as needed for chest pain., Disp: 30 tablet, Rfl: 3  •  omeprazole (PriLOSEC) 20 MG capsule, Take 20 mg by mouth Daily., Disp: , Rfl: 0  •  polyethylene glycol (MIRALAX) packet, Take 17 g by mouth Daily As Needed., Disp: , Rfl:   •  rosuvastatin (CRESTOR) 20 MG tablet, Take 20 mg by mouth Daily., Disp: , Rfl:   •  traMADol (ULTRAM) 50 MG tablet, Take 50 mg by mouth 2 (Two) Times a Day., Disp: , Rfl:    ?   ?   Review of patient's allergies indicates no known allergies.         ECG 12 Lead  Date/Time: 1/25/2018 1:13 PM  Performed by: JORDI GARCIA  Authorized by: JORDI GARCIA                ?   Assessment/Plan    ?   ? EKG performed and reviewed by ELISEO Boo. Patient is back in sinus rhythm. Patient can decrease her amiodarone back to 200 mg daily and decrease the coreg to 3.125 mg BID. Patient needs to have her BP monitored and if she continues to be hypotensive they are to call our office and more than likely entresto will be Discontinued per ELISEO Boo. 1 week nurse visit for symptom check and EKG. Daughter to call our office with questions or concerns and verbalized understanding. The family states they prefer a quality of life over a quantity of life at this point for their mother.   ?

## 2018-01-29 ENCOUNTER — TELEPHONE (OUTPATIENT)
Dept: CARDIOLOGY | Facility: CLINIC | Age: 83
End: 2018-01-29

## 2018-01-29 NOTE — TELEPHONE ENCOUNTER
----- Message from Dede Kilgore LPN sent at 1/29/2018 12:52 PM EST -----  Contact: Karla with [facility] [phone]   [facility] Nurse reports patient is having increase SOA at rest. She has had a 1.4 lb weight gain over night and is restless. She states she has diminished lung sounds through out. They are requesting to be advised with what they can do.     Spoke with Cristal, patient's daughter. She states that the patient is doing well right now. She states that she was restless last PM, but that was more to do with the area she has on her bottom. She states that she has not changed that much. She also reports that Dr. Leger was also contacted by [facility] and that he had come out to see the patient. She states that he wanted to stop a lot of her medications, have a Mahmood Cath placed, and consult Hosp[facility]. Cristal states that she does not feel that they are at that point at this time. She states Richard instructed her on how to give the patient her medications and she will continue that for now and will see Richard at patient's appointment on Thursday.

## 2018-02-01 ENCOUNTER — TELEPHONE (OUTPATIENT)
Dept: CARDIOLOGY | Facility: CLINIC | Age: 83
End: 2018-02-01

## 2018-02-01 NOTE — TELEPHONE ENCOUNTER
----- Message from Qiana Saucedo sent at 2/1/2018 10:34 AM EST -----  Contact: MARIS (DAUGHTER)  THE PATIENTS DAUGHTER CALLED AND STATES SHE IS NOT FEELING WELL TODAY AND SHE WANTS TO CANCEL HER APPOINTMENT FOR A NURSE VISIT.  SHE STATES HER BLOOD PRESSURE HAS BEEN RUNNING GOOD HOWEVER TODAY HER HEART RATE IS ELEVATED .  WE HAVE CANCELED HER APPT FOR TODAY AND WILL RESCHEDULE THAT ONCE SHE HAS SPOKEN WITH A NURSE.  SHE CAN BE REACHED [phone].  THANKS       Cristal states she has decided to proceed with placing the patient with [facility]. She states she just wanted to make us aware and for the time she is going to proceed with her medications as instructed. She will call back at the first of the week to reschedule appointment depending on how the patient feels.